# Patient Record
Sex: MALE | Race: WHITE | NOT HISPANIC OR LATINO
[De-identification: names, ages, dates, MRNs, and addresses within clinical notes are randomized per-mention and may not be internally consistent; named-entity substitution may affect disease eponyms.]

---

## 2018-04-30 PROBLEM — Z00.00 ENCOUNTER FOR PREVENTIVE HEALTH EXAMINATION: Status: ACTIVE | Noted: 2018-04-30

## 2018-05-14 ENCOUNTER — APPOINTMENT (OUTPATIENT)
Dept: OPHTHALMOLOGY | Facility: CLINIC | Age: 73
End: 2018-05-14
Payer: MEDICARE

## 2018-05-14 DIAGNOSIS — H26.9 UNSPECIFIED CATARACT: ICD-10-CM

## 2018-05-14 DIAGNOSIS — H43.393 OTHER VITREOUS OPACITIES, BILATERAL: ICD-10-CM

## 2018-05-14 DIAGNOSIS — Z98.890 OTHER SPECIFIED POSTPROCEDURAL STATES: ICD-10-CM

## 2018-05-14 PROCEDURE — 92014 COMPRE OPH EXAM EST PT 1/>: CPT

## 2018-05-14 PROCEDURE — 92004 COMPRE OPH EXAM NEW PT 1/>: CPT

## 2019-10-21 ENCOUNTER — APPOINTMENT (OUTPATIENT)
Dept: OPHTHALMOLOGY | Facility: CLINIC | Age: 74
End: 2019-10-21
Payer: MEDICARE

## 2019-10-21 ENCOUNTER — NON-APPOINTMENT (OUTPATIENT)
Age: 74
End: 2019-10-21

## 2019-10-21 PROCEDURE — 92014 COMPRE OPH EXAM EST PT 1/>: CPT

## 2024-04-01 ENCOUNTER — INPATIENT (INPATIENT)
Facility: HOSPITAL | Age: 79
LOS: 7 days | Discharge: HOME | DRG: 233 | End: 2024-04-09
Attending: THORACIC SURGERY (CARDIOTHORACIC VASCULAR SURGERY) | Admitting: STUDENT IN AN ORGANIZED HEALTH CARE EDUCATION/TRAINING PROGRAM
Payer: MEDICARE

## 2024-04-01 VITALS
TEMPERATURE: 99 F | HEIGHT: 63 IN | RESPIRATION RATE: 18 BRPM | DIASTOLIC BLOOD PRESSURE: 73 MMHG | WEIGHT: 149.91 LBS | HEART RATE: 99 BPM | OXYGEN SATURATION: 97 % | SYSTOLIC BLOOD PRESSURE: 116 MMHG

## 2024-04-01 DIAGNOSIS — I10 ESSENTIAL (PRIMARY) HYPERTENSION: ICD-10-CM

## 2024-04-01 DIAGNOSIS — S83.8X2A SPRAIN OF OTHER SPECIFIED PARTS OF LEFT KNEE, INITIAL ENCOUNTER: Chronic | ICD-10-CM

## 2024-04-01 DIAGNOSIS — E11.9 TYPE 2 DIABETES MELLITUS WITHOUT COMPLICATIONS: ICD-10-CM

## 2024-04-01 DIAGNOSIS — Z90.89 ACQUIRED ABSENCE OF OTHER ORGANS: Chronic | ICD-10-CM

## 2024-04-01 DIAGNOSIS — D64.9 ANEMIA, UNSPECIFIED: ICD-10-CM

## 2024-04-01 DIAGNOSIS — E78.00 PURE HYPERCHOLESTEROLEMIA, UNSPECIFIED: ICD-10-CM

## 2024-04-01 DIAGNOSIS — K08.409 PARTIAL LOSS OF TEETH, UNSPECIFIED CAUSE, UNSPECIFIED CLASS: Chronic | ICD-10-CM

## 2024-04-01 DIAGNOSIS — C61 MALIGNANT NEOPLASM OF PROSTATE: ICD-10-CM

## 2024-04-01 DIAGNOSIS — R07.9 CHEST PAIN, UNSPECIFIED: ICD-10-CM

## 2024-04-01 LAB
ADD ON TEST-SPECIMEN IN LAB: SIGNIFICANT CHANGE UP
ALBUMIN SERPL ELPH-MCNC: 4 G/DL — SIGNIFICANT CHANGE UP (ref 3.3–5)
ALP SERPL-CCNC: 96 U/L — SIGNIFICANT CHANGE UP (ref 40–120)
ALT FLD-CCNC: 13 U/L — SIGNIFICANT CHANGE UP (ref 10–45)
ANION GAP SERPL CALC-SCNC: 12 MMOL/L — SIGNIFICANT CHANGE UP (ref 5–17)
APTT BLD: 33.2 SEC — SIGNIFICANT CHANGE UP (ref 24.5–35.6)
AST SERPL-CCNC: 16 U/L — SIGNIFICANT CHANGE UP (ref 10–40)
BASOPHILS # BLD AUTO: 0.01 K/UL — SIGNIFICANT CHANGE UP (ref 0–0.2)
BASOPHILS NFR BLD AUTO: 0.2 % — SIGNIFICANT CHANGE UP (ref 0–2)
BILIRUB SERPL-MCNC: 0.3 MG/DL — SIGNIFICANT CHANGE UP (ref 0.2–1.2)
BUN SERPL-MCNC: 51 MG/DL — HIGH (ref 7–23)
CALCIUM SERPL-MCNC: 9.2 MG/DL — SIGNIFICANT CHANGE UP (ref 8.4–10.5)
CHLORIDE SERPL-SCNC: 104 MMOL/L — SIGNIFICANT CHANGE UP (ref 96–108)
CK MB CFR SERPL CALC: 1.9 NG/ML — SIGNIFICANT CHANGE UP (ref 0–6.7)
CK SERPL-CCNC: 34 U/L — SIGNIFICANT CHANGE UP (ref 30–200)
CO2 SERPL-SCNC: 25 MMOL/L — SIGNIFICANT CHANGE UP (ref 22–31)
CREAT SERPL-MCNC: 2.04 MG/DL — HIGH (ref 0.5–1.3)
EGFR: 33 ML/MIN/1.73M2 — LOW
EOSINOPHIL # BLD AUTO: 0.09 K/UL — SIGNIFICANT CHANGE UP (ref 0–0.5)
EOSINOPHIL NFR BLD AUTO: 2.1 % — SIGNIFICANT CHANGE UP (ref 0–6)
GLUCOSE BLDC GLUCOMTR-MCNC: 98 MG/DL — SIGNIFICANT CHANGE UP (ref 70–99)
GLUCOSE SERPL-MCNC: 140 MG/DL — HIGH (ref 70–99)
HCT VFR BLD CALC: 28.7 % — LOW (ref 39–50)
HGB BLD-MCNC: 9.5 G/DL — LOW (ref 13–17)
IMM GRANULOCYTES NFR BLD AUTO: 0.2 % — SIGNIFICANT CHANGE UP (ref 0–0.9)
INR BLD: 0.95 — SIGNIFICANT CHANGE UP (ref 0.85–1.18)
LYMPHOCYTES # BLD AUTO: 0.83 K/UL — LOW (ref 1–3.3)
LYMPHOCYTES # BLD AUTO: 18.9 % — SIGNIFICANT CHANGE UP (ref 13–44)
MAGNESIUM SERPL-MCNC: 1.8 MG/DL — SIGNIFICANT CHANGE UP (ref 1.6–2.6)
MCHC RBC-ENTMCNC: 31 PG — SIGNIFICANT CHANGE UP (ref 27–34)
MCHC RBC-ENTMCNC: 33.1 GM/DL — SIGNIFICANT CHANGE UP (ref 32–36)
MCV RBC AUTO: 93.8 FL — SIGNIFICANT CHANGE UP (ref 80–100)
MONOCYTES # BLD AUTO: 0.57 K/UL — SIGNIFICANT CHANGE UP (ref 0–0.9)
MONOCYTES NFR BLD AUTO: 13 % — SIGNIFICANT CHANGE UP (ref 2–14)
NEUTROPHILS # BLD AUTO: 2.88 K/UL — SIGNIFICANT CHANGE UP (ref 1.8–7.4)
NEUTROPHILS NFR BLD AUTO: 65.6 % — SIGNIFICANT CHANGE UP (ref 43–77)
NRBC # BLD: 0 /100 WBCS — SIGNIFICANT CHANGE UP (ref 0–0)
PLATELET # BLD AUTO: 145 K/UL — LOW (ref 150–400)
POTASSIUM SERPL-MCNC: 4.3 MMOL/L — SIGNIFICANT CHANGE UP (ref 3.5–5.3)
POTASSIUM SERPL-SCNC: 4.3 MMOL/L — SIGNIFICANT CHANGE UP (ref 3.5–5.3)
PROT SERPL-MCNC: 6.3 G/DL — SIGNIFICANT CHANGE UP (ref 6–8.3)
PROTHROM AB SERPL-ACNC: 10.9 SEC — SIGNIFICANT CHANGE UP (ref 9.5–13)
RBC # BLD: 3.06 M/UL — LOW (ref 4.2–5.8)
RBC # FLD: 14.6 % — HIGH (ref 10.3–14.5)
SODIUM SERPL-SCNC: 141 MMOL/L — SIGNIFICANT CHANGE UP (ref 135–145)
TROPONIN T, HIGH SENSITIVITY RESULT: 15 NG/L — SIGNIFICANT CHANGE UP (ref 0–51)
WBC # BLD: 4.39 K/UL — SIGNIFICANT CHANGE UP (ref 3.8–10.5)
WBC # FLD AUTO: 4.39 K/UL — SIGNIFICANT CHANGE UP (ref 3.8–10.5)

## 2024-04-01 PROCEDURE — 99223 1ST HOSP IP/OBS HIGH 75: CPT

## 2024-04-01 PROCEDURE — 99285 EMERGENCY DEPT VISIT HI MDM: CPT | Mod: FS

## 2024-04-01 PROCEDURE — 71045 X-RAY EXAM CHEST 1 VIEW: CPT | Mod: 26

## 2024-04-01 RX ORDER — TIZANIDINE 4 MG/1
2 TABLET ORAL
Refills: 0 | DISCHARGE

## 2024-04-01 RX ORDER — DEXTROSE 50 % IN WATER 50 %
25 SYRINGE (ML) INTRAVENOUS ONCE
Refills: 0 | Status: DISCONTINUED | OUTPATIENT
Start: 2024-04-01 | End: 2024-04-05

## 2024-04-01 RX ORDER — BUDESONIDE 32 UG/1
1 SPRAY, METERED NASAL
Refills: 0 | DISCHARGE

## 2024-04-01 RX ORDER — DEXTROSE 50 % IN WATER 50 %
12.5 SYRINGE (ML) INTRAVENOUS ONCE
Refills: 0 | Status: DISCONTINUED | OUTPATIENT
Start: 2024-04-01 | End: 2024-04-05

## 2024-04-01 RX ORDER — DICLOFENAC SODIUM 30 MG/G
2 GEL TOPICAL
Refills: 0 | DISCHARGE

## 2024-04-01 RX ORDER — ASPIRIN/CALCIUM CARB/MAGNESIUM 324 MG
324 TABLET ORAL ONCE
Refills: 0 | Status: COMPLETED | OUTPATIENT
Start: 2024-04-01 | End: 2024-04-01

## 2024-04-01 RX ORDER — BUPROPION HYDROCHLORIDE 150 MG/1
1 TABLET, EXTENDED RELEASE ORAL
Refills: 0 | DISCHARGE

## 2024-04-01 RX ORDER — POLYETHYLENE GLYCOL 3350 17 G/17G
17 POWDER, FOR SOLUTION ORAL
Refills: 0 | DISCHARGE

## 2024-04-01 RX ORDER — FEXOFENADINE HCL 30 MG
1 TABLET ORAL
Refills: 0 | DISCHARGE

## 2024-04-01 RX ORDER — AMLODIPINE BESYLATE 2.5 MG/1
10 TABLET ORAL DAILY
Refills: 0 | Status: DISCONTINUED | OUTPATIENT
Start: 2024-04-01 | End: 2024-04-02

## 2024-04-01 RX ORDER — ESCITALOPRAM OXALATE 10 MG/1
20 TABLET, FILM COATED ORAL DAILY
Refills: 0 | Status: DISCONTINUED | OUTPATIENT
Start: 2024-04-01 | End: 2024-04-05

## 2024-04-01 RX ORDER — INSULIN LISPRO 100/ML
VIAL (ML) SUBCUTANEOUS AT BEDTIME
Refills: 0 | Status: DISCONTINUED | OUTPATIENT
Start: 2024-04-01 | End: 2024-04-05

## 2024-04-01 RX ORDER — ATORVASTATIN CALCIUM 80 MG/1
40 TABLET, FILM COATED ORAL AT BEDTIME
Refills: 0 | Status: DISCONTINUED | OUTPATIENT
Start: 2024-04-01 | End: 2024-04-05

## 2024-04-01 RX ORDER — FLUTICASONE PROPIONATE 50 MCG
1 SPRAY, SUSPENSION NASAL
Refills: 0 | Status: DISCONTINUED | OUTPATIENT
Start: 2024-04-01 | End: 2024-04-05

## 2024-04-01 RX ORDER — SODIUM CHLORIDE 9 MG/ML
1000 INJECTION, SOLUTION INTRAVENOUS
Refills: 0 | Status: DISCONTINUED | OUTPATIENT
Start: 2024-04-01 | End: 2024-04-05

## 2024-04-01 RX ORDER — GLUCAGON INJECTION, SOLUTION 0.5 MG/.1ML
1 INJECTION, SOLUTION SUBCUTANEOUS ONCE
Refills: 0 | Status: DISCONTINUED | OUTPATIENT
Start: 2024-04-01 | End: 2024-04-05

## 2024-04-01 RX ORDER — CLOPIDOGREL BISULFATE 75 MG/1
300 TABLET, FILM COATED ORAL ONCE
Refills: 0 | Status: COMPLETED | OUTPATIENT
Start: 2024-04-01 | End: 2024-04-01

## 2024-04-01 RX ORDER — COLCHICINE 0.6 MG
1 TABLET ORAL
Refills: 0 | DISCHARGE

## 2024-04-01 RX ORDER — SODIUM CHLORIDE 9 MG/ML
1000 INJECTION INTRAMUSCULAR; INTRAVENOUS; SUBCUTANEOUS
Refills: 0 | Status: DISCONTINUED | OUTPATIENT
Start: 2024-04-02 | End: 2024-04-02

## 2024-04-01 RX ORDER — KETOCONAZOLE 20 MG/G
1 AEROSOL, FOAM TOPICAL
Refills: 0 | DISCHARGE

## 2024-04-01 RX ORDER — DEXTROSE 50 % IN WATER 50 %
15 SYRINGE (ML) INTRAVENOUS ONCE
Refills: 0 | Status: DISCONTINUED | OUTPATIENT
Start: 2024-04-01 | End: 2024-04-05

## 2024-04-01 RX ORDER — INSULIN LISPRO 100/ML
VIAL (ML) SUBCUTANEOUS
Refills: 0 | Status: DISCONTINUED | OUTPATIENT
Start: 2024-04-01 | End: 2024-04-05

## 2024-04-01 RX ADMIN — ESCITALOPRAM OXALATE 20 MILLIGRAM(S): 10 TABLET, FILM COATED ORAL at 23:29

## 2024-04-01 RX ADMIN — ATORVASTATIN CALCIUM 40 MILLIGRAM(S): 80 TABLET, FILM COATED ORAL at 22:58

## 2024-04-01 RX ADMIN — CLOPIDOGREL BISULFATE 300 MILLIGRAM(S): 75 TABLET, FILM COATED ORAL at 20:58

## 2024-04-01 RX ADMIN — Medication 324 MILLIGRAM(S): at 20:46

## 2024-04-01 RX ADMIN — AMLODIPINE BESYLATE 10 MILLIGRAM(S): 2.5 TABLET ORAL at 22:58

## 2024-04-01 NOTE — ED PROVIDER NOTE - PROGRESS NOTE DETAILS
hgb 9.5 / hct 28.7.   creatinine 2.0  no old labs for comparison but creatinine consistent w pts history of ckd.   ecg nonischemic. initial trop 15. cxr clear  vitals stable and cp free. given asa and plavix load.   admitted to [facility].

## 2024-04-01 NOTE — H&P ADULT - PROBLEM SELECTOR PLAN 1
Recurrent chest pain associated with SOB and abnormal exercise stress test  Pt loaded with Aspirin 325mg and Plavix 300mg  Possible cath in AM  Telemetry, IVF, NPO  Echo in AM

## 2024-04-01 NOTE — H&P ADULT - NSHPREVIEWOFSYSTEMS_GEN_ALL_CORE
GENERAL, CONSTITUTIONAL : denies recent weight loss, fever, chills  EYES, VISION: denies changes in vision   EARS, NOSE, THROAT: denies hearing loss  HEART, CARDIOVASCULAR:  + chest pain. SOB, . Denies arrhythmia, palpitations, LE edema, claudication  RESPIRATORY: +  SOB. Denies cough, wheezing, PND, orthopnea  GASTROINTESTINAL: +  bloody stool, dark tarry stool. Denies abdominal pain, heartburn,  GENITOURINARY: Denies frequent urination, urgency  MUSCULOSKELETAL: +  joint pain. Denies or swelling, restricted motion, musculoskeletal pain.   SKIN & INTEGUMENTARY: + Facial rash. Denies sores, blisters, blisters, growths.  NEUROLOGICAL: Denies numbness or tingling sensations, sensation loss, burning.   PSYCHIATRIC: Denies nervousness, anxiety, depression  ENDOCRINE Denies heat or cold intolerance, excessive thirst  HEMATOLOGIC/LYMPHATIC: Denies abnormal bleeding, bleeding of any kind

## 2024-04-01 NOTE — H&P ADULT - NSHPLABSRESULTS_GEN_ALL_CORE
9.5    4.39  )-----------( 145      ( 01 Apr 2024 19:10 )             28.7       04-01    141  |  104  |  51<H>  ----------------------------<  140<H>  4.3   |  25  |  2.04<H>    Ca    9.2      01 Apr 2024 19:10  Mg     1.8     04-01    TPro  6.3  /  Alb  4.0  /  TBili  0.3  /  DBili  x   /  AST  16  /  ALT  13  /  AlkPhos  96  04-01      PT/INR - ( 01 Apr 2024 19:10 )   PT: 10.9 sec;   INR: 0.95          PTT - ( 01 Apr 2024 19:10 )  PTT:33.2 sec    CARDIAC MARKERS ( 01 Apr 2024 19:10 )  x     / x     / 34 U/L / x     / 1.9 ng/mL        Urinalysis Basic - ( 01 Apr 2024 19:10 )    Color: x / Appearance: x / SG: x / pH: x  Gluc: 140 mg/dL / Ketone: x  / Bili: x / Urobili: x   Blood: x / Protein: x / Nitrite: x   Leuk Esterase: x / RBC: x / WBC x   Sq Epi: x / Non Sq Epi: x / Bacteria: x

## 2024-04-01 NOTE — ED PROVIDER NOTE - CLINICAL SUMMARY MEDICAL DECISION MAKING FREE TEXT BOX
history of bph, prostate ca (s/p radiation, no current treatment), CKD, here w two episodes of chest pain after failed stress test at cardiologist tonight. one episode of chest pain at rest. +sob and nausea. currently cp free. episodes of cp intermittent x 4-5 weeks.   discussed w cardiologist dr dorian arora .  pt well appearing, stable vitals, exam unremarkable  cp while at rest after failed stress test today. discussed w cardiologist.   admit for unstable angina.   ecg on arrival nonischemic  will load w aspirin. plan for admission. pt aware and agreeable. cp free. stable vitals.

## 2024-04-01 NOTE — PATIENT PROFILE ADULT - FALL HARM RISK - HARM RISK INTERVENTIONS

## 2024-04-01 NOTE — H&P ADULT - NSICDXPASTMEDICALHX_GEN_ALL_CORE_FT
PAST MEDICAL HISTORY:  Diabetes     Genital herpes     Gout     H/O arthritis     Hypercholesterolemia     Hypertension     IBS (irritable bowel syndrome)     Obstructive sleep apnea     Prostate cancer     Shingles

## 2024-04-01 NOTE — H&P ADULT - HISTORY OF PRESENT ILLNESS
This is a 79 y/o male with HTN, DM-II, Hypercholesterolemia, prostate Ca (s/p radiation, no current treatment), CKD, presents to the [facility] ER with chest pain that started over last 4-5 weeks. Pain initially started while walking in [place], relieved with rest. Since then, he has been experiencing pain at rest and minimal exertion, increased in frequency and associated with SOB. Pain is rated 7-8/10, lasting a few minutes. He had an episode of nausea and vomiting day before admission.   He had a stress test on day of admission that was abnormal -- as per the cardiologist Dr Morton, pt achieved 4.5 mins on treadmill, reached target HR of 129bpm. He had chest discomfort at 3rd minute, EKG showed PVCs, no ST-T changes. Chest pain resolved once he stopped exercise. Shortly after, EKG showed slurred ST segments in inferolateral leads that took 7 mins to resolve. He was sent home with recommendation to return for a planned nuclear stress test the next day. However, he had multiple episodes of chest pain, SOB after leaving the office, therefore, he came to the ER for evaluation      In the ER, initial /73, HR 99, R 18, T 98.7, O2 sat 97% RA. Labs with H/H 9.5/28.7, Ptls 145, BUN/Cr 51/2.04, trop 15. He received Aspirin 325mg and Plavix 300mg load and admitted to telemetry for further evaluation

## 2024-04-01 NOTE — H&P ADULT - PROBLEM SELECTOR PLAN 2
Pt with IBS and occasional tarry stool  Pt with planned EGD/Colonoscopy end of April at [facility]  H/H 9.5/28.7 on admission  May need GI clearance prior to cath

## 2024-04-01 NOTE — H&P ADULT - PROBLEM SELECTOR PLAN 7
Completed treatment at [facility], now in remission  Continue to monitor

## 2024-04-01 NOTE — ED PROVIDER NOTE - OBJECTIVE STATEMENT
78 yr old male, history of bph, prostate ca (s/p radiation, no current treatment), CKD, presents to the Emergency Department w chest pain. over last 4-5 weeks pt having episodes of chest discomfort and SOB. increasing in frequency. two episodes yesterday. had stress test today that was abnormal, was planned for a nuclear stress test tomorrow. had two further episodes of chest discomfort once leaving office. one when walking to PT appointment that resolved w rest. second episode was at rest at home. +sob and nausea.     discussed w cardiologist dr dorian arora .  stress test toda - 4.5 mins on treadmill, reached target HR of 129bpm. chest discomfort at 3rd minute. ekg showed pvcs no st/t changes. once stopped exercise cp resolved. shortly after ecg showed slurred ST segments in inferolateral leads that took 7 mins to resolve. 78 yr old male, history of bph, prostate ca (s/p radiation, no current treatment), CKD, presents to the Emergency Department w chest pain. over last 4-5 weeks pt having episodes of chest discomfort and SOB. increasing in frequency. two episodes yesterday. had stress test today that was abnormal, was planned for a nuclear stress test tomorrow. had two further episodes of chest discomfort once leaving office. one when walking to PT appointment that resolved w rest. second episode was at rest at home. +sob and nausea. cardiologist told her to come to ED for admission. currently chest pain free.   no palpitations, vomiting, leg swelling, near-syncope / syncope. no fever, abd pain. no headache, vision changes, extremity weakness / numbness / tingling.   no recent infectious symptoms. no prolonged immobilization / recent travel, hx dvt/pe.    discussed w cardiologist dr dorian arora .  stress test toda - 4.5 mins on treadmill, reached target HR of 129bpm. chest discomfort at 3rd minute. ekg showed pvcs no st/t changes. once stopped exercise cp resolved. shortly after ecg showed slurred ST segments in inferolateral leads that took 7 mins to resolve.

## 2024-04-01 NOTE — H&P ADULT - NSICDXPASTSURGICALHX_GEN_ALL_CORE_FT
PAST SURGICAL HISTORY:  History of tonsillectomy     Injury of meniscus of left knee     S/P tooth extraction

## 2024-04-01 NOTE — H&P ADULT - NSHPPHYSICALEXAM_GEN_ALL_CORE
T(C): 36.6 (04-01-24 @ 22:09), Max: 37.1 (04-01-24 @ 17:14)  HR: 73 (04-01-24 @ 22:09) (69 - 99)  BP: 120/72 (04-01-24 @ 22:09) (116/73 - 124/77)  RR: 16 (04-01-24 @ 22:09) (16 - 18)  SpO2: 97% (04-01-24 @ 22:09) (97% - 98%)  Wt(kg): --    Appearance: Normal	  HEENT:   Normal oral mucosa, PERRL, EOMI	  Neck: Supple,  - JVD; No Carotid Bruit and 2+ pulses B/L  Cardiovascular: Normal S1 S2, No JVD, + 3/6 TANYA  Respiratory: Lungs clear to auscultation, no Rales, Rhonchi, Wheezing	  Gastrointestinal:  Soft, Non-tender, + BS	  Skin: No rashes, No ecchymoses, No cyanosis  Extremities: Normal range of motion, No clubbing, cyanosis or edema  Vascular: Femoral pulses 2+ b/l without bruit, DP 1+ b/l, PT 1+ b/l  Neurologic: Non-focal  Psychiatry: A & O x 3, Mood & affect appropriate

## 2024-04-01 NOTE — ED ADULT NURSE NOTE - OBJECTIVE STATEMENT
77 y/o M with pmhx CKD3, BPH, presents to the ED c/o CP since this afternoon. Per pt, "I was at a stress test earlier today because I had CP. They told me they saw something abnormal and that I needed to come back for a nuclear stress test tomorrow. I still had pain so I just came here." CP is left sided, intermittent, and does not radiate. Nothing makes pain better. Walking makes pain worse and induces SOB. Denies palpitations, N/V, diaphoresis, and any other complaints at this time. On exam, A&Ox3, NAD, resting comfortably in bed. Respirations even and unlabored, speaking in complete sentences, no increased WOB. Skin color appropriate for ethnicity. Pt placed on the cardiac monitor. PIV placed. Labs sent.

## 2024-04-01 NOTE — ED ADULT TRIAGE NOTE - CHIEF COMPLAINT QUOTE
Pt aaox3 c/o CP s/p stress test this afternoon ~2.5 hrs ago. Pt had stress test done because of CP with exertion, but this afternoon the CP persisted even at rest. Pain is midsternal and radiates to the left. Pt endorses mild SOB at this time.

## 2024-04-01 NOTE — H&P ADULT - PROBLEM SELECTOR PLAN 3
BUN 51, Cr 2.04 on admission  Unknown baseline  Start IV hydration overnight for possible cath in AM

## 2024-04-01 NOTE — ED ADULT NURSE REASSESSMENT NOTE - NS ED NURSE REASSESS COMMENT FT1
Received pt for continuous care. Pending results, dispo, plan.   Pt is alert and oriented, A&O4, steady gait noted.

## 2024-04-01 NOTE — H&P ADULT - NS ATTEND AMEND GEN_ALL_CORE FT
77 yo man PMHx of DM2, HTN, prostate cancer on remission, and CKD (last Cr 1.7) who was admitted for unstable angina and recent abnormal exercise stress test (ST depression lasting to recovery, chest pain, and PVCs).    Assessment  1. Unstable angina   Recent abnormal exercise stress test (STD to recovery, chest pain, and PVCs)  2. HTN  3. DM2  4. CKD  5. Anemia  Patient denies any recent clinical bleeding    Plan  1. Plan for [facility] today  2. ASA 81mg PO QD and high intensity statin  3. TTE  4. Pre and post cath IVF for CKD  5. Anemia workup with iron studies  6. Amlodipine 10mg PO QD    During non face-to-face time, I reviewed relevant portions of the patient’s medical record. During face-to-face time, I took a relevant history and examined the patient. I also explained differential diagnoses, relevant cardiac diagnoses, workup, and management plan, which required a high level of medical decision making. I answered all questions related to the patient's medical conditions.     Yamilet Gould M.D.  Attending Cardiologist  [facility]

## 2024-04-02 ENCOUNTER — TRANSCRIPTION ENCOUNTER (OUTPATIENT)
Age: 79
End: 2024-04-02

## 2024-04-02 ENCOUNTER — RESULT REVIEW (OUTPATIENT)
Age: 79
End: 2024-04-02

## 2024-04-02 DIAGNOSIS — N18.9 CHRONIC KIDNEY DISEASE, UNSPECIFIED: ICD-10-CM

## 2024-04-02 LAB
A1C WITH ESTIMATED AVERAGE GLUCOSE RESULT: 5.3 % — SIGNIFICANT CHANGE UP (ref 4–5.6)
ANION GAP SERPL CALC-SCNC: 10 MMOL/L — SIGNIFICANT CHANGE UP (ref 5–17)
BUN SERPL-MCNC: 44 MG/DL — HIGH (ref 7–23)
CALCIUM SERPL-MCNC: 9.2 MG/DL — SIGNIFICANT CHANGE UP (ref 8.4–10.5)
CHLORIDE SERPL-SCNC: 108 MMOL/L — SIGNIFICANT CHANGE UP (ref 96–108)
CHOLEST SERPL-MCNC: 98 MG/DL — SIGNIFICANT CHANGE UP
CK MB CFR SERPL CALC: 1.9 NG/ML — SIGNIFICANT CHANGE UP (ref 0–6.7)
CK SERPL-CCNC: 36 U/L — SIGNIFICANT CHANGE UP (ref 30–200)
CO2 SERPL-SCNC: 25 MMOL/L — SIGNIFICANT CHANGE UP (ref 22–31)
CREAT SERPL-MCNC: 1.74 MG/DL — HIGH (ref 0.5–1.3)
EGFR: 40 ML/MIN/1.73M2 — LOW
ESTIMATED AVERAGE GLUCOSE: 105 MG/DL — SIGNIFICANT CHANGE UP (ref 68–114)
GLUCOSE BLDC GLUCOMTR-MCNC: 104 MG/DL — HIGH (ref 70–99)
GLUCOSE BLDC GLUCOMTR-MCNC: 120 MG/DL — HIGH (ref 70–99)
GLUCOSE BLDC GLUCOMTR-MCNC: 138 MG/DL — HIGH (ref 70–99)
GLUCOSE BLDC GLUCOMTR-MCNC: 96 MG/DL — SIGNIFICANT CHANGE UP (ref 70–99)
GLUCOSE SERPL-MCNC: 91 MG/DL — SIGNIFICANT CHANGE UP (ref 70–99)
HCT VFR BLD CALC: 30.6 % — LOW (ref 39–50)
HCV AB S/CO SERPL IA: 0.04 S/CO — SIGNIFICANT CHANGE UP
HCV AB SERPL-IMP: SIGNIFICANT CHANGE UP
HDLC SERPL-MCNC: 48 MG/DL — SIGNIFICANT CHANGE UP
HGB BLD-MCNC: 9.9 G/DL — LOW (ref 13–17)
LIPID PNL WITH DIRECT LDL SERPL: 38 MG/DL — SIGNIFICANT CHANGE UP
MAGNESIUM SERPL-MCNC: 2 MG/DL — SIGNIFICANT CHANGE UP (ref 1.6–2.6)
MCHC RBC-ENTMCNC: 31 PG — SIGNIFICANT CHANGE UP (ref 27–34)
MCHC RBC-ENTMCNC: 32.4 GM/DL — SIGNIFICANT CHANGE UP (ref 32–36)
MCV RBC AUTO: 95.9 FL — SIGNIFICANT CHANGE UP (ref 80–100)
NON HDL CHOLESTEROL: 50 MG/DL — SIGNIFICANT CHANGE UP
NRBC # BLD: 0 /100 WBCS — SIGNIFICANT CHANGE UP (ref 0–0)
OB PNL STL: NEGATIVE — SIGNIFICANT CHANGE UP
PLATELET # BLD AUTO: 142 K/UL — LOW (ref 150–400)
POTASSIUM SERPL-MCNC: 4.2 MMOL/L — SIGNIFICANT CHANGE UP (ref 3.5–5.3)
POTASSIUM SERPL-SCNC: 4.2 MMOL/L — SIGNIFICANT CHANGE UP (ref 3.5–5.3)
RBC # BLD: 3.19 M/UL — LOW (ref 4.2–5.8)
RBC # FLD: 14.3 % — SIGNIFICANT CHANGE UP (ref 10.3–14.5)
SODIUM SERPL-SCNC: 143 MMOL/L — SIGNIFICANT CHANGE UP (ref 135–145)
TRIGL SERPL-MCNC: 48 MG/DL — SIGNIFICANT CHANGE UP
TROPONIN T, HIGH SENSITIVITY RESULT: 18 NG/L — SIGNIFICANT CHANGE UP (ref 0–51)
WBC # BLD: 4.06 K/UL — SIGNIFICANT CHANGE UP (ref 3.8–10.5)
WBC # FLD AUTO: 4.06 K/UL — SIGNIFICANT CHANGE UP (ref 3.8–10.5)

## 2024-04-02 PROCEDURE — 93880 EXTRACRANIAL BILAT STUDY: CPT | Mod: 26

## 2024-04-02 PROCEDURE — 93458 L HRT ARTERY/VENTRICLE ANGIO: CPT | Mod: 26

## 2024-04-02 PROCEDURE — 93306 TTE W/DOPPLER COMPLETE: CPT | Mod: 26

## 2024-04-02 PROCEDURE — 99152 MOD SED SAME PHYS/QHP 5/>YRS: CPT

## 2024-04-02 PROCEDURE — 71046 X-RAY EXAM CHEST 2 VIEWS: CPT | Mod: 26

## 2024-04-02 RX ORDER — METOPROLOL TARTRATE 50 MG
12.5 TABLET ORAL EVERY 8 HOURS
Refills: 0 | Status: DISCONTINUED | OUTPATIENT
Start: 2024-04-02 | End: 2024-04-05

## 2024-04-02 RX ORDER — ASPIRIN/CALCIUM CARB/MAGNESIUM 324 MG
81 TABLET ORAL DAILY
Refills: 0 | Status: DISCONTINUED | OUTPATIENT
Start: 2024-04-02 | End: 2024-04-05

## 2024-04-02 RX ORDER — CLOPIDOGREL BISULFATE 75 MG/1
75 TABLET, FILM COATED ORAL DAILY
Refills: 0 | Status: DISCONTINUED | OUTPATIENT
Start: 2024-04-02 | End: 2024-04-02

## 2024-04-02 RX ORDER — PANTOPRAZOLE SODIUM 20 MG/1
40 TABLET, DELAYED RELEASE ORAL
Refills: 0 | Status: DISCONTINUED | OUTPATIENT
Start: 2024-04-02 | End: 2024-04-05

## 2024-04-02 RX ORDER — BUPROPION HYDROCHLORIDE 150 MG/1
150 TABLET, EXTENDED RELEASE ORAL DAILY
Refills: 0 | Status: DISCONTINUED | OUTPATIENT
Start: 2024-04-02 | End: 2024-04-05

## 2024-04-02 RX ORDER — SODIUM CHLORIDE 9 MG/ML
1000 INJECTION INTRAMUSCULAR; INTRAVENOUS; SUBCUTANEOUS
Refills: 0 | Status: DISCONTINUED | OUTPATIENT
Start: 2024-04-02 | End: 2024-04-08

## 2024-04-02 RX ORDER — POLYETHYLENE GLYCOL 3350 17 G/17G
17 POWDER, FOR SOLUTION ORAL DAILY
Refills: 0 | Status: DISCONTINUED | OUTPATIENT
Start: 2024-04-02 | End: 2024-04-05

## 2024-04-02 RX ORDER — ALLOPURINOL 300 MG
300 TABLET ORAL DAILY
Refills: 0 | Status: DISCONTINUED | OUTPATIENT
Start: 2024-04-02 | End: 2024-04-05

## 2024-04-02 RX ORDER — HEPARIN SODIUM 5000 [USP'U]/ML
5000 INJECTION INTRAVENOUS; SUBCUTANEOUS EVERY 8 HOURS
Refills: 0 | Status: DISCONTINUED | OUTPATIENT
Start: 2024-04-03 | End: 2024-04-05

## 2024-04-02 RX ORDER — ACETAMINOPHEN 500 MG
650 TABLET ORAL EVERY 6 HOURS
Refills: 0 | Status: DISCONTINUED | OUTPATIENT
Start: 2024-04-02 | End: 2024-04-05

## 2024-04-02 RX ADMIN — Medication 12.5 MILLIGRAM(S): at 18:01

## 2024-04-02 RX ADMIN — ESCITALOPRAM OXALATE 20 MILLIGRAM(S): 10 TABLET, FILM COATED ORAL at 21:35

## 2024-04-02 RX ADMIN — ATORVASTATIN CALCIUM 40 MILLIGRAM(S): 80 TABLET, FILM COATED ORAL at 21:35

## 2024-04-02 RX ADMIN — Medication 1 SPRAY(S): at 07:54

## 2024-04-02 RX ADMIN — AMLODIPINE BESYLATE 10 MILLIGRAM(S): 2.5 TABLET ORAL at 06:12

## 2024-04-02 RX ADMIN — SODIUM CHLORIDE 230 MILLILITER(S): 9 INJECTION INTRAMUSCULAR; INTRAVENOUS; SUBCUTANEOUS at 14:43

## 2024-04-02 RX ADMIN — Medication 1 SPRAY(S): at 18:01

## 2024-04-02 RX ADMIN — SODIUM CHLORIDE 75 MILLILITER(S): 9 INJECTION INTRAMUSCULAR; INTRAVENOUS; SUBCUTANEOUS at 00:19

## 2024-04-02 RX ADMIN — Medication 81 MILLIGRAM(S): at 12:05

## 2024-04-02 RX ADMIN — Medication 300 MILLIGRAM(S): at 21:35

## 2024-04-02 RX ADMIN — CLOPIDOGREL BISULFATE 75 MILLIGRAM(S): 75 TABLET, FILM COATED ORAL at 12:05

## 2024-04-02 RX ADMIN — PANTOPRAZOLE SODIUM 40 MILLIGRAM(S): 20 TABLET, DELAYED RELEASE ORAL at 12:05

## 2024-04-02 NOTE — PROGRESS NOTE ADULT - PROBLEM SELECTOR PLAN 6
Pt unsure of Rosuvastatin dose, will call pharmacy in AM  Check Lipid profile Pt unsure of Rosuvastatin dose, will call pharmacy in AM  -lipid profile unremarkable

## 2024-04-02 NOTE — PROGRESS NOTE ADULT - ASSESSMENT
78 male, HTN, DM-II, HPL, prostate cancer, presents to the ER with recurrent chest pain and SOB after abnormal outpatient exercise stress test  04/01/24 with residual symptoms at home after procedure. Patient loaded with ASA and Plavix in ED, planned for cath 04/02, Course c/b Cr 2.01 Bun 51 now s/p pre-cath fluids. C/b anemia, history of tarry stools with outpatient Colonoscopy/EGD planned for April with [facility].  78 male, HTN, DM-II, HPL, prostate cancer, presents to the ER with recurrent chest pain and SOB after abnormal outpatient exercise stress test  04/01/24 with residual symptoms at home after procedure. Patient loaded with ASA and Plavix in ED, planned for cath 04/02, Course c/b Cr 2.01 Bun 51 now s/p pre-cath fluids. C/b anemia, history of tarry stools with outpatient Colonoscopy/EGD planned for April with [facility].     EKG: 			  ASA II	Mallampati class: I   Anginal Class: III    PULSES:   -Radial: 2+ b/l   -Femoral: no bruits b/l   -DP: 2+ b/l   -PT: 2+ b/l     -Levaquin (Rash)  clarithromycin (Other)  penicillins (Unknown)    -H/H = 9.9/30.6  9.5/28.7  -Patient reports he has been experiePt denies BRBPR, hematuria, hematochezia, melena. Patient initially loaded with Plavix 300mg PO and ASA 325mg PO in ER yesterday. Today, Pt loaded w/ ASA 81 mg x1 and Plavix 75 mg x1  -BUN/Cr = 44/1.74  -EF unknown pending TTE. Euvolemic on exam. has been relieving IVF overnight at 75 cc/hr hrs pre procedure    Sedation Plan:   Moderate  Patient Is Suitable Candidate For Sedation?     Yes    Risks & benefits of procedure and alternative therapy have been explained to the patient by the Interventional Cardiology Fellow including but not limited to: allergic reaction, bleeding with possible need for blood transfusion, infection, renal and vascular compromise, limb damage, arrhythmia, stroke, vessel dissection/perforation, myocardial infarction, and emergent CABG. Informed consent obtained at bedside and included in chart. 78 male, HTN, DM-II, HPL, prostate cancer, presents to the ER with recurrent chest pain and SOB after abnormal outpatient exercise stress test  04/01/24 with residual symptoms at home after procedure. Patient loaded with ASA and Plavix in ED, planned for cath 04/02, Course c/b Cr 2.01 Bun 51 now s/p pre-cath fluids. C/b anemia, history of tarry stools with outpatient Colonoscopy/EGD planned for April with [facility].     			  ASA II	Mallampati class: I   Anginal Class: III    PULSES:   -Radial: 2+ b/l   -Femoral: no bruits b/l   -DP: 2+ b/l   -PT: 2+ b/l     -Levaquin (Rash)  clarithromycin (Other)  penicillins (Unknown)    -H/H = 9.9/30.6  9.5/28.7  -Patient reports he has been experiencing dark tarry stools on/off and has an EGD/colonoscopy planned for end of April. These findings discussed with Dr. Portillo and Dr. owusu who agree to move forward with cath. Patient initially loaded with Plavix 300mg PO and ASA 325mg PO in ER yesterday. Today, Pt loaded w/ ASA 81 mg x1 and Plavix 75 mg x1  -BUN/Cr = 44/1.74  -EF unknown pending TTE. Euvolemic on exam. has been on IVF overnight at 75 cc/hr hrs pre procedure    Sedation Plan:   Moderate  Patient Is Suitable Candidate For Sedation?     Yes    Risks & benefits of procedure and alternative therapy have been explained to the patient by the Interventional Cardiology Fellow including but not limited to: allergic reaction, bleeding with possible need for blood transfusion, infection, renal and vascular compromise, limb damage, arrhythmia, stroke, vessel dissection/perforation, myocardial infarction, and emergent CABG. Informed consent obtained at bedside and included in chart. 78 male, HTN, DM-II, HPL, prostate cancer, presents to the ER with recurrent chest pain and SOB after abnormal outpatient exercise stress test  04/01/24 with residual symptoms at home after procedure. Patient loaded with ASA and Plavix in ED, planned for cath 04/02, Course c/b Cr 2.01 Bun 51 now s/p pre-cath fluids. C/b anemia, history of tarry stools with outpatient Colonoscopy/EGD planned for April with [id number]. Planned for C with Dr. Portillo today to r/o ACS i/s/o unstable angina and abnormal exercise stress test     			  ASA II	Mallampati class: I   Anginal Class: III    PULSES:   -Radial: 2+ b/l   -Femoral: no bruits b/l   -DP: 2+ b/l   -PT: 2+ b/l     -Levaquin (Rash)  clarithromycin (Other)  penicillins (Unknown)    -H/H = 9.9/30.6  9.5/28.7  -Patient reports he has been experiencing dark tarry stools on/off and has an EGD/colonoscopy planned for end of April. These findings discussed with Dr. Portillo and Dr. owusu who agree to move forward with cath. Patient initially loaded with Plavix 300mg PO and ASA 325mg PO in ER yesterday. Today, Pt loaded w/ ASA 81 mg x1 and Plavix 75 mg x1  -BUN/Cr = 44/1.74  -EF unknown pending TTE. Euvolemic on exam. has been on IVF overnight at 75 cc/hr hrs pre procedure    Sedation Plan:   Moderate  Patient Is Suitable Candidate For Sedation?     Yes    Risks & benefits of procedure and alternative therapy have been explained to the patient by the Interventional Cardiology Fellow including but not limited to: allergic reaction, bleeding with possible need for blood transfusion, infection, renal and vascular compromise, limb damage, arrhythmia, stroke, vessel dissection/perforation, myocardial infarction, and emergent CABG. Informed consent obtained at bedside and included in chart. 78 male, HTN, DM-II, HPL, prostate cancer, presents to the ER with recurrent chest pain and SOB after abnormal outpatient exercise stress test  04/01/24 with residual symptoms at home after procedure. Patient loaded with ASA and Plavix in ED, planned for cath 04/02, Course c/b Cr 2.01 Bun 51 now s/p pre-cath fluids. C/b anemia, history of tarry stools with outpatient Colonoscopy/EGD planned for April with [facility]. Planned for C with Dr. Portillo today to r/o ACS i/s/o unstable angina and abnormal exercise stress test.     			  ASA II	Mallampati class: I   Anginal Class: III    PULSES:   -Radial: 2+ b/l   -Femoral: no bruits b/l   -DP: 2+ b/l   -PT: 2+ b/l     -Levaquin (Rash)  clarithromycin (Other)  penicillins (Unknown)    -H/H = 9.9/30.6  9.5/28.7  -Patient reports he has been experiencing dark tarry stools on/off and has an EGD/colonoscopy planned for end of April. These findings discussed with Dr. Portillo and Dr. owusu who agree to move forward with cath. Patient initially loaded with Plavix 300mg PO and ASA 325mg PO in ER yesterday. Today, Pt loaded w/ ASA 81 mg x1 and Plavix 75 mg x1  -BUN/Cr = 44/1.74  -EF unknown pending TTE. Euvolemic on exam. has been on IVF overnight at 75 cc/hr hrs pre procedure    Sedation Plan:   Moderate  Patient Is Suitable Candidate For Sedation?     Yes    Risks & benefits of procedure and alternative therapy have been explained to the patient by the Interventional Cardiology Fellow including but not limited to: allergic reaction, bleeding with possible need for blood transfusion, infection, renal and vascular compromise, limb damage, arrhythmia, stroke, vessel dissection/perforation, myocardial infarction, and emergent CABG. Informed consent obtained at bedside and included in chart.

## 2024-04-02 NOTE — PROGRESS NOTE ADULT - PROBLEM SELECTOR PLAN 1
Recurrent chest pain associated with SOB and abnormal exercise stress test  Pt loaded with Aspirin 325mg and Plavix 300mg in ED  Scheduled for Cath 04/02  Pending TTE 04/02 Recurrent chest pain associated with SOB and abnormal exercise stress test  Pt loaded with Aspirin 325mg and Plavix 300mg in ED  Scheduled for Cath 04/02  Pending TTE 04/02  Systolic murmur, patient reports MVP Recurrent chest pain associated with SOB and abnormal exercise stress test, neg trops in ED and non ischemic EKG in ER  Pt loaded with Aspirin 325mg and Plavix 300mg in ED 4/1/24   Scheduled for Cath today with Dr. Portillo: consent in chart, DAPT ordered and IVF running   Pending TTE 04/02  Systolic murmur, patient reports MVP

## 2024-04-02 NOTE — PROGRESS NOTE ADULT - PROBLEM SELECTOR PLAN 2
Pt with IBS-C and occasional tarry stool  Pt with planned EGD/Colonoscopy end of April at [facility]  H/H 9.5/28.7 on admission  May need GI clearance prior to cath Pt with IBS-C and occasional tarry stool  Pt with [place] [facility]  -fecal occult test negative this admission   H/H 9.5/28.7 on admission-- per the son at bedside, patient is at baseline H/H   -f/u iron studies in AM

## 2024-04-02 NOTE — PROGRESS NOTE ADULT - SUBJECTIVE AND OBJECTIVE BOX
Cardiology PA Adult Progress Note    Subjective Assessment:    ROS negative except as noted above.  	  MEDICATIONS:  amLODIPine   Tablet 10 milliGRAM(s) Oral daily  escitalopram 20 milliGRAM(s) Oral daily  atorvastatin 40 milliGRAM(s) Oral at bedtime  dextrose 50% Injectable 12.5 Gram(s) IV Push once  dextrose 50% Injectable 25 Gram(s) IV Push once  dextrose 50% Injectable 25 Gram(s) IV Push once  dextrose Oral Gel 15 Gram(s) Oral once PRN  glucagon  Injectable 1 milliGRAM(s) IntraMuscular once  insulin lispro (ADMELOG) corrective regimen sliding scale   SubCutaneous at bedtime  insulin lispro (ADMELOG) corrective regimen sliding scale   SubCutaneous three times a day before meals  dextrose 5%. 1000 milliLiter(s) IV Continuous <Continuous>  dextrose 5%. 1000 milliLiter(s) IV Continuous <Continuous>  fluticasone propionate 50 MICROgram(s)/spray Nasal Spray 1 Spray(s) Both Nostrils two times a day  sodium chloride 0.9%. 1000 milliLiter(s) IV Continuous <Continuous>      	    [PHYSICAL EXAM:  TELEMETRY:  T(C): 36.4 (04-02-24 @ 06:09), Max: 37.1 (04-01-24 @ 17:14)  HR: 73 (04-02-24 @ 06:09) (69 - 99)  BP: 118/70 (04-02-24 @ 06:09) (107/59 - 124/77)  RR: 20 (04-02-24 @ 06:09) (16 - 20)  SpO2: 98% (04-02-24 @ 06:09) (97% - 98%)  Wt(kg): --  I&O's Summary    Height (cm): 160 (04-01 @ 22:09)  Weight (kg): 68.6 (04-01 @ 22:09)  BMI (kg/m2): 26.8 (04-01 @ 22:09)  BSA (m2): 1.72 (04-01 @ 22:09)                                        Appearance: Normal	  HEENT:   Normal oral mucosa, PERRLA, EOMI	  Neck: Supple, + JVD/ - JVD; Carotid Bruit   Cardiovascular: Normal S1 S2, No JVD, No murmurs,   Respiratory: Lungs clear to auscultation/Decreased Breath Sounds/No Rales, Rhonchi, Wheezing	  Gastrointestinal:  Soft, Non-tender, + BS	  Skin: No rashes, No ecchymoses, No cyanosis  Extremities: Normal range of motion, No clubbing, cyanosis or edema  Vascular: Peripheral pulses palpable 2+ bilaterally  Neurologic: Non-focal  Psychiatry: A & O x 3, Mood & affect appropriate      	    ECG:  	  RADIOLOGY:   DIAGNOSTIC TESTING:  [ ] Echocardiogram:   [ ]  Catheterization:  [ ] Stress Test:    [ ] YESSY  OTHER: 	    LABS:	 	  CARDIAC MARKERS:                                  9.5    4.39  )-----------( 145      ( 01 Apr 2024 19:10 )             28.7     04-01    141  |  104  |  51<H>  ----------------------------<  140<H>  4.3   |  25  |  2.04<H>    Ca    9.2      01 Apr 2024 19:10  Mg     1.8     04-01    TPro  6.3  /  Alb  4.0  /  TBili  0.3  /  DBili  x   /  AST  16  /  ALT  13  /  AlkPhos  96  04-01    proBNP:   Lipid Profile:   HgA1c:   TSH:   PT/INR - ( 01 Apr 2024 19:10 )   PT: 10.9 sec;   INR: 0.95          PTT - ( 01 Apr 2024 19:10 )  PTT:33.2 sec Cardiology PA Adult Progress Note    Subjective Assessment: Patient seen and examined at bedside. Endorses a slight sensation of shortness of breath, feeling like he can't take a full breath. States this shortness of breath has been ongoing and is better than before. Denies active chest pain, palpitations, headache, nausea.   ROS negative except as noted above.   	  MEDICATIONS:  amLODIPine   Tablet 10 milliGRAM(s) Oral daily  escitalopram 20 milliGRAM(s) Oral daily  atorvastatin 40 milliGRAM(s) Oral at bedtime  dextrose 50% Injectable 12.5 Gram(s) IV Push once  dextrose 50% Injectable 25 Gram(s) IV Push once  dextrose 50% Injectable 25 Gram(s) IV Push once  dextrose Oral Gel 15 Gram(s) Oral once PRN  glucagon  Injectable 1 milliGRAM(s) IntraMuscular once  insulin lispro (ADMELOG) corrective regimen sliding scale SubCutaneous at bedtime  insulin lispro (ADMELOG) corrective regimen sliding scale SubCutaneous three times a day before meals  dextrose 5%. 1000 milliLiter(s) IV Continuous <Continuous>  dextrose 5%. 1000 milliLiter(s) IV Continuous <Continuous>  fluticasone propionate 50 MICROgram(s)/spray Nasal Spray 1 Spray(s) Both Nostrils two times a day  sodium chloride 0.9%. 1000 milliLiter(s) IV Continuous <Continuous>      	    [PHYSICAL EXAM:  TELEMETRY:  T(C): 36.4 (04-02-24 @ 06:09), Max: 37.1 (04-01-24 @ 17:14)  HR: 73 (04-02-24 @ 06:09) (69 - 99)  BP: 118/70 (04-02-24 @ 06:09) (107/59 - 124/77)  RR: 20 (04-02-24 @ 06:09) (16 - 20)  SpO2: 98% (04-02-24 @ 06:09) (97% - 98%)  Wt(kg): --  I&O's Summary    Height (cm): 160 (04-01 @ 22:09)  Weight (kg): 68.6 (04-01 @ 22:09)  BMI (kg/m2): 26.8 (04-01 @ 22:09)  BSA (m2): 1.72 (04-01 @ 22:09)                                        Appearance: Normal	  HEENT:   Normal oral mucosa, PERRLA, EOMI	  Neck: Supple, - JVD, no carotid bruit  Cardiovascular: Normal S1 S2, No JVD, No murmurs, gallops or rubs.   Respiratory: Lungs clear to auscultation, No rales, rhonchi, wheezing  Gastrointestinal:  Soft, Non-tender, + BS	  Skin: No rashes, No ecchymoses, No cyanosis  Extremities: Normal range of motion, No clubbing, cyanosis or edema  Vascular: Peripheral pulses palpable 2+ bilaterally  Neurologic: Non-focal  Psychiatry: A & O x 3, Mood & affect appropriate      	    ECG:  	  RADIOLOGY:   DIAGNOSTIC TESTING:  [ ] Echocardiogram:   [ ]  Catheterization:  [ ] Stress Test:    [ ] YESSY  OTHER: 	    LABS:	 	  CARDIAC MARKERS:                        9.5    4.39  )-----------( 145      ( 01 Apr 2024 19:10 )             28.7     04-01    141  |  104  |  51<H>  ----------------------------<  140<H>  4.3   |  25  |  2.04<H>    Ca    9.2      01 Apr 2024 19:10  Mg     1.8     04-01    TPro  6.3  /  Alb  4.0  /  TBili  0.3  /  DBili  x   /  AST  16  /  ALT  13  /  AlkPhos  96  04-01    proBNP:   Lipid Profile:   HgA1c:   TSH:   PT/INR - ( 01 Apr 2024 19:10 )   PT: 10.9 sec;   INR: 0.95          PTT - ( 01 Apr 2024 19:10 )  PTT:33.2 sec Cardiology PA Adult Progress Note    Subjective Assessment: Patient seen and examined at bedside. Endorses a slight sensation of shortness of breath, feeling like he can't take a full breath. States this shortness of breath has been ongoing and is better than before. Denies active chest pain, palpitations, headache, nausea.   ROS negative except as noted above.   	  MEDICATIONS:  amLODIPine   Tablet 10 milliGRAM(s) Oral daily  escitalopram 20 milliGRAM(s) Oral daily  atorvastatin 40 milliGRAM(s) Oral at bedtime  dextrose 50% Injectable 12.5 Gram(s) IV Push once  dextrose 50% Injectable 25 Gram(s) IV Push once  dextrose 50% Injectable 25 Gram(s) IV Push once  dextrose Oral Gel 15 Gram(s) Oral once PRN  glucagon  Injectable 1 milliGRAM(s) IntraMuscular once  insulin lispro (ADMELOG) corrective regimen sliding scale SubCutaneous at bedtime  insulin lispro (ADMELOG) corrective regimen sliding scale SubCutaneous three times a day before meals  dextrose 5%. 1000 milliLiter(s) IV Continuous <Continuous>  dextrose 5%. 1000 milliLiter(s) IV Continuous <Continuous>  fluticasone propionate 50 MICROgram(s)/spray Nasal Spray 1 Spray(s) Both Nostrils two times a day  sodium chloride 0.9%. 1000 milliLiter(s) IV Continuous <Continuous>      	    [PHYSICAL EXAM:  TELEMETRY:  T(C): 36.4 (04-02-24 @ 06:09), Max: 37.1 (04-01-24 @ 17:14)  HR: 73 (04-02-24 @ 06:09) (69 - 99)  BP: 118/70 (04-02-24 @ 06:09) (107/59 - 124/77)  RR: 20 (04-02-24 @ 06:09) (16 - 20)  SpO2: 98% (04-02-24 @ 06:09) (97% - 98%)  Wt(kg): --  I&O's Summary    Height (cm): 160 (04-01 @ 22:09)  Weight (kg): 68.6 (04-01 @ 22:09)  BMI (kg/m2): 26.8 (04-01 @ 22:09)  BSA (m2): 1.72 (04-01 @ 22:09)                                        Appearance: Normal	  HEENT:   Normal oral mucosa, PERRLA, EOMI	  Neck: Supple, - JVD, no carotid bruit  Cardiovascular: Normal S1 S2, No JVD, systolic murmur appreciated.   Respiratory: Lungs clear to auscultation, No rales, rhonchi, wheezing  Gastrointestinal:  Soft, Non-tender, + BS	  Skin: No rashes, No ecchymoses, No cyanosis  Extremities: Normal range of motion, No clubbing, cyanosis or edema  Vascular: Peripheral pulses palpable 2+ bilaterally  Neurologic: Non-focal  Psychiatry: A & O x 3, Mood & affect appropriate      	    ECG:  	  RADIOLOGY:   DIAGNOSTIC TESTING:  [ ] Echocardiogram:   [ ]  Catheterization:  [ ] Stress Test:    [ ] YESSY  OTHER: 	    LABS:	 	  CARDIAC MARKERS:                        9.5    4.39  )-----------( 145      ( 01 Apr 2024 19:10 )             28.7     04-01    141  |  104  |  51<H>  ----------------------------<  140<H>  4.3   |  25  |  2.04<H>    Ca    9.2      01 Apr 2024 19:10  Mg     1.8     04-01    TPro  6.3  /  Alb  4.0  /  TBili  0.3  /  DBili  x   /  AST  16  /  ALT  13  /  AlkPhos  96  04-01    proBNP:   Lipid Profile:   HgA1c:   TSH:   PT/INR - ( 01 Apr 2024 19:10 )   PT: 10.9 sec;   INR: 0.95          PTT - ( 01 Apr 2024 19:10 )  PTT:33.2 sec Cardiology PA Adult Progress Note    Subjective Assessment: Patient seen and examined at bedside. Endorses a slight sensation of shortness of breath, feeling like he can't take a full breath. States this shortness of breath has been ongoing and is better than before. Denies active chest pain, palpitations, headache, nausea.     ROS negative except as noted above.   	  MEDICATIONS:  amLODIPine   Tablet 10 milliGRAM(s) Oral daily  escitalopram 20 milliGRAM(s) Oral daily  atorvastatin 40 milliGRAM(s) Oral at bedtime  dextrose 50% Injectable 12.5 Gram(s) IV Push once  dextrose 50% Injectable 25 Gram(s) IV Push once  dextrose 50% Injectable 25 Gram(s) IV Push once  dextrose Oral Gel 15 Gram(s) Oral once PRN  glucagon  Injectable 1 milliGRAM(s) IntraMuscular once  insulin lispro (ADMELOG) corrective regimen sliding scale SubCutaneous at bedtime  insulin lispro (ADMELOG) corrective regimen sliding scale SubCutaneous three times a day before meals  dextrose 5%. 1000 milliLiter(s) IV Continuous <Continuous>  dextrose 5%. 1000 milliLiter(s) IV Continuous <Continuous>  fluticasone propionate 50 MICROgram(s)/spray Nasal Spray 1 Spray(s) Both Nostrils two times a day  sodium chloride 0.9%. 1000 milliLiter(s) IV Continuous <Continuous>      	    [PHYSICAL EXAM:  TELEMETRY:  T(C): 36.4 (04-02-24 @ 06:09), Max: 37.1 (04-01-24 @ 17:14)  HR: 73 (04-02-24 @ 06:09) (69 - 99)  BP: 118/70 (04-02-24 @ 06:09) (107/59 - 124/77)  RR: 20 (04-02-24 @ 06:09) (16 - 20)  SpO2: 98% (04-02-24 @ 06:09) (97% - 98%)  Wt(kg): --  I&O's Summary    Height (cm): 160 (04-01 @ 22:09)  Weight (kg): 68.6 (04-01 @ 22:09)  BMI (kg/m2): 26.8 (04-01 @ 22:09)  BSA (m2): 1.72 (04-01 @ 22:09)                                        Appearance: Normal	  HEENT:   Normal oral mucosa, PERRLA, EOMI	  Neck: Supple, - JVD, no carotid bruit  Cardiovascular: Normal S1 S2, No JVD, systolic murmur appreciated.   Respiratory: Lungs clear to auscultation, No rales, rhonchi, wheezing  Gastrointestinal:  Soft, Non-tender, + BS	  Skin: No rashes, No ecchymoses, No cyanosis  Extremities: Normal range of motion, No clubbing, cyanosis or edema  Vascular: Peripheral pulses palpable 2+ bilaterally  Neurologic: Non-focal  Psychiatry: A & O x 3, Mood & affect appropriate      	    ECG:  	  RADIOLOGY:   DIAGNOSTIC TESTING:  [ ] Echocardiogram:   [ ]  Catheterization:  [ ] Stress Test:    [ ] YESSY  OTHER: 	    LABS:	 	  CARDIAC MARKERS:                        9.5    4.39  )-----------( 145      ( 01 Apr 2024 19:10 )             28.7     04-01    141  |  104  |  51<H>  ----------------------------<  140<H>  4.3   |  25  |  2.04<H>    Ca    9.2      01 Apr 2024 19:10  Mg     1.8     04-01    TPro  6.3  /  Alb  4.0  /  TBili  0.3  /  DBili  x   /  AST  16  /  ALT  13  /  AlkPhos  96  04-01    proBNP:   Lipid Profile:   HgA1c:   TSH:   PT/INR - ( 01 Apr 2024 19:10 )   PT: 10.9 sec;   INR: 0.95          PTT - ( 01 Apr 2024 19:10 )  PTT:33.2 sec **********TRANSFER TO CT SURGERY NOTE*************       Hospital Course: This is a 77 y/o male with HTN, DM-II, Hypercholesterolemia, prostate Ca (s/p radiation, no current treatment), CKD, presents to the [facility] ER with chest pain that started over last 4-5 weeks. Pain initially started while walking in [place], relieved with rest. Since then, he has been experiencing pain at rest and minimal exertion, increased in frequency and associated with SOB. Pain is rated 7-8/10, lasting a few minutes. He had an episode of nausea and vomiting day before admission.   He had a stress test on day of admission that was abnormal -- as per the cardiologist Dr Morton, pt achieved 4.5 mins on treadmill, reached target HR of 129bpm. He had chest discomfort at 3rd minute, EKG showed PVCs, no ST-T changes. Chest pain resolved once he stopped exercise. Shortly after, EKG showed slurred ST segments in inferolateral leads that took 7 mins to resolve. He was sent home with recommendation to return for a planned nuclear stress test the next day. However, he had multiple episodes of chest pain, SOB after leaving the office, therefore, he came to the ER for evaluation. In the ER, initial /73, HR 99, R 18, T 98.7, O2 sat 97% RA. Labs with H/H 9.5/28.7, Ptls 145, BUN/Cr 51/2.04, trop 15. He received Aspirin 325mg and Plavix 300mg load and admitted to telemetry for further evaluation. Patient is now s/p [facility] with Dr. Portillo 4/2/24: Diagnostic C 04/02/24: LM mild luminal, pLAD 60-70%, D1 mild diffuse disease L-L collaterals to LAD, mLAD  100%, prox-distal LCx 90%, OM1 mild diffuse disease, RCA mild diffuse diseaa. EDP 11 mmHg, Access: R Radial TR (due at 4PM), IC/Fellow: Lakhwinder/Jean, IVF: 232 cc/6 hrs. Patient is going being transferred to CT surgery service under Dr. Lopez for planned MIDCAB later this week.     Subjective Assessment: Patient seen and examined at bedside. Endorses a slight sensation of shortness of breath, feeling like he can't take a full breath. States this shortness of breath has been ongoing and is better than before. Denies active chest pain, palpitations, headache, nausea.     ROS negative except as noted above.   	  MEDICATIONS:  amLODIPine   Tablet 10 milliGRAM(s) Oral daily  escitalopram 20 milliGRAM(s) Oral daily  atorvastatin 40 milliGRAM(s) Oral at bedtime  dextrose 50% Injectable 12.5 Gram(s) IV Push once  dextrose 50% Injectable 25 Gram(s) IV Push once  dextrose 50% Injectable 25 Gram(s) IV Push once  dextrose Oral Gel 15 Gram(s) Oral once PRN  glucagon  Injectable 1 milliGRAM(s) IntraMuscular once  insulin lispro (ADMELOG) corrective regimen sliding scale SubCutaneous at bedtime  insulin lispro (ADMELOG) corrective regimen sliding scale SubCutaneous three times a day before meals  dextrose 5%. 1000 milliLiter(s) IV Continuous <Continuous>  dextrose 5%. 1000 milliLiter(s) IV Continuous <Continuous>  fluticasone propionate 50 MICROgram(s)/spray Nasal Spray 1 Spray(s) Both Nostrils two times a day  sodium chloride 0.9%. 1000 milliLiter(s) IV Continuous <Continuous>      	    [PHYSICAL EXAM:  TELEMETRY:  T(C): 36.4 (04-02-24 @ 06:09), Max: 37.1 (04-01-24 @ 17:14)  HR: 73 (04-02-24 @ 06:09) (69 - 99)  BP: 118/70 (04-02-24 @ 06:09) (107/59 - 124/77)  RR: 20 (04-02-24 @ 06:09) (16 - 20)  SpO2: 98% (04-02-24 @ 06:09) (97% - 98%)  Wt(kg): --  I&O's Summary    Height (cm): 160 (04-01 @ 22:09)  Weight (kg): 68.6 (04-01 @ 22:09)  BMI (kg/m2): 26.8 (04-01 @ 22:09)  BSA (m2): 1.72 (04-01 @ 22:09)                                        Appearance: Normal	  HEENT:   Normal oral mucosa, PERRLA, EOMI	  Neck: Supple, - JVD, no carotid bruit  Cardiovascular: Normal S1 S2, No JVD, systolic murmur appreciated.   Respiratory: Lungs clear to auscultation, No rales, rhonchi, wheezing  Gastrointestinal:  Soft, Non-tender, + BS	  Skin: No rashes, No ecchymoses, No cyanosis  Extremities: Normal range of motion, No clubbing, cyanosis or edema  Vascular: Peripheral pulses palpable 2+ bilaterally  Neurologic: Non-focal  Psychiatry: A & O x 3, Mood & affect appropriate      	    ECG:  	  RADIOLOGY:   DIAGNOSTIC TESTING:  [ ] Echocardiogram:   [ ]  Catheterization:  [ ] Stress Test:    [ ] YESSY  OTHER: 	    LABS:	 	  CARDIAC MARKERS:                        9.5    4.39  )-----------( 145      ( 01 Apr 2024 19:10 )             28.7     04-01    141  |  104  |  51<H>  ----------------------------<  140<H>  4.3   |  25  |  2.04<H>    Ca    9.2      01 Apr 2024 19:10  Mg     1.8     04-01    TPro  6.3  /  Alb  4.0  /  TBili  0.3  /  DBili  x   /  AST  16  /  ALT  13  /  AlkPhos  96  04-01    proBNP:   Lipid Profile:   HgA1c:   TSH:   PT/INR - ( 01 Apr 2024 19:10 )   PT: 10.9 sec;   INR: 0.95          PTT - ( 01 Apr 2024 19:10 )  PTT:33.2 sec

## 2024-04-02 NOTE — PROGRESS NOTE ADULT - PROBLEM SELECTOR PLAN 3
BUN 51, Cr 2.04 on admission  Unknown baseline  IV hydration given overnight for cath BUN 51, Cr 2.04 on admission  -sCr this AM: 1.74 sp IVF   -per son at bedside, most recent outpatinet labs c/w sCr baseline 1.7-2   avoid nephrotic agents and cont to monitor

## 2024-04-02 NOTE — CONSULT NOTE ADULT - ASSESSMENT
This is a 79 y/o male with HTN, DM-II, Hypercholesterolemia, prostate Ca (s/p radiation, no current treatment), CKD, presents to the [facility] ER with chest pain that started over last 4-5 weeks. Pain initially started while walking in [place], relieved with rest. Since then, he has been experiencing pain at rest and minimal exertion, increased in frequency and associated with SOB. Pain is rated 7-8/10, lasting a few minutes. He had an episode of nausea and vomiting day before admission.   He had a stress test on day of admission that was abnormal -- as per the cardiologist Dr Morton, pt achieved 4.5 mins on treadmill, reached target HR of 129bpm. He had chest discomfort at 3rd minute, EKG showed PVCs, no ST-T changes. Chest pain resolved once he stopped exercise. Shortly after, EKG showed slurred ST segments in inferolateral leads that took 7 mins to resolve. He was sent home with recommendation to return for a planned nuclear stress test the next day. However, he had multiple episodes of chest pain, SOB after leaving the office, therefore, he came to the ER for evaluation.    Pt underwent cath on 4/2 which revealed 2vCAD so was transferred to CTS service for evaluation for MIDCAB.      Plan:    Neurovascular:   -Pain well controlled with current regimen. PRN's: tylenol    Cardiovascular:   -Hemodynamically stable.   -Monitor: BP, HR, tele  -2v CAD s/p cath    -transfer to CTS service for eval for CABG this week with Dr. Lopez    -c/w ASA    -hold plavix pre op  -HTN    -metoprolol 12.5 mg TID  -HLD    -c/w lipitor    Respiratory:   -Oxygenating well on room air  -Encourage continued use of IS 10x/hr and frequent ambulation  -CXR: WNL    GI:  -GI PPX: protonix  -PO Diet  -Bowel Regimen: miralax    Renal / :  -Continue to monitor renal function: BUN/Cr 44/1.74    -continue to trend  -Monitor I/O's daily     Endocrine:    -DM  -A1c: 5.3    -c/w ISS  -TSH: pending    Hematologic:  -CBC: H/H- 9.9/30  -Coagulation Panel.    ID:  -Temperature: afebrile  -CBC: WBC-4  -Continue to observe for SIRS/Sepsis Syndrome.    Prophylaxis:  -DVT prophylaxis with 5000 SubQ Heparin q8h.  -Continue with SCD's b/l while patient is at rest     Disposition:  -Likely CABG later this week

## 2024-04-02 NOTE — CONSULT NOTE ADULT - SUBJECTIVE AND OBJECTIVE BOX
Surgeon/Attending MD: John    Requesting Physician: June    HISTORY OF PRESENT ILLNESS (Need 4):    This is a 77 y/o male with HTN, DM-II, Hypercholesterolemia, prostate Ca (s/p radiation, no current treatment), CKD, presents to the [facility] ER with chest pain that started over last 4-5 weeks. Pain initially started while walking in [place], relieved with rest. Since then, he has been experiencing pain at rest and minimal exertion, increased in frequency and associated with SOB. Pain is rated 7-8/10, lasting a few minutes. He had an episode of nausea and vomiting day before admission.   He had a stress test on day of admission that was abnormal -- as per the cardiologist Dr Morton, pt achieved 4.5 mins on treadmill, reached target HR of 129bpm. He had chest discomfort at 3rd minute, EKG showed PVCs, no ST-T changes. Chest pain resolved once he stopped exercise. Shortly after, EKG showed slurred ST segments in inferolateral leads that took 7 mins to resolve. He was sent home with recommendation to return for a planned nuclear stress test the next day. However, he had multiple episodes of chest pain, SOB after leaving the office, therefore, he came to the ER for evaluation.    Pt underwent cath on 4/2 which revealed 2vCAD so was transferred to CTS service for evaluation for MIDCAB.      PAST MEDICAL & SURGICAL HISTORY:  Gout      Prostate cancer      IBS (irritable bowel syndrome)      Hypertension      Diabetes      Hypercholesterolemia      H/O arthritis      Genital herpes      Obstructive sleep apnea      Shingles      History of tonsillectomy      S/P tooth extraction      Injury of meniscus of left knee          MEDICATIONS  (STANDING):  allopurinol 300 milliGRAM(s) Oral daily  aspirin enteric coated 81 milliGRAM(s) Oral daily  atorvastatin 40 milliGRAM(s) Oral at bedtime  buPROPion XL (24-Hour) . 150 milliGRAM(s) Oral daily  dextrose 5%. 1000 milliLiter(s) (50 mL/Hr) IV Continuous <Continuous>  dextrose 5%. 1000 milliLiter(s) (100 mL/Hr) IV Continuous <Continuous>  dextrose 50% Injectable 25 Gram(s) IV Push once  dextrose 50% Injectable 12.5 Gram(s) IV Push once  dextrose 50% Injectable 25 Gram(s) IV Push once  escitalopram 20 milliGRAM(s) Oral daily  fluticasone propionate 50 MICROgram(s)/spray Nasal Spray 1 Spray(s) Both Nostrils two times a day  glucagon  Injectable 1 milliGRAM(s) IntraMuscular once  insulin lispro (ADMELOG) corrective regimen sliding scale   SubCutaneous three times a day before meals  insulin lispro (ADMELOG) corrective regimen sliding scale   SubCutaneous at bedtime  metoprolol tartrate 12.5 milliGRAM(s) Oral every 8 hours  pantoprazole    Tablet 40 milliGRAM(s) Oral before breakfast  polyethylene glycol 3350 17 Gram(s) Oral daily  sodium chloride 0.9%. 1000 milliLiter(s) (230 mL/Hr) IV Continuous <Continuous>    MEDICATIONS  (PRN):  acetaminophen     Tablet .. 650 milliGRAM(s) Oral every 6 hours PRN Mild Pain (1 - 3)  dextrose Oral Gel 15 Gram(s) Oral once PRN Blood Glucose LESS THAN 70 milliGRAM(s)/deciliter      Allergies    Levaquin (Rash)  clarithromycin (Other)  penicillins (Unknown)    Intolerances        SOCIAL HISTORY:  Smoker:  NO          ETOH use:  YES-socially  Ilicit Drug use:   NO      FAMILY HISTORY:  FH: CAD (coronary artery disease) (Father, Sibling)        Review of Systems (Need 10):  CONSTITUTIONAL: Denies fevers / chills, sweats, fatigue, weight loss, weight gain                                       NEURO:  Denies parathesias, seizures, syncope, confusion                                                                                  EYES:  Denies blurry vision, discharge, pain, loss of vision                                                                                    ENMT:  Denies difficulty hearing, vertigo, dysphagia, epistaxis, recent dental work                                       CV:  mild chest discomfort                                                                                          RESPIRATORY:  Denies wWheezing, SOB, cough / sputum, hemoptysis                                                               GI:  Denies nausea, vomiting, diarrhea, constipation, melena                                                                          : Denies hematuria, dysuria, urgency, incontinence                                                                                          MUSKULOSKELETAL:  Denies arthritis, joint swelling, muscle weakness                                                             SKIN/BREAST:  Denies rash, itching, hair loss, masses                                                                                              PSYCH:  Denies depression, anxiety, suicidal ideation                                                                                                HEME/LYMPH:  Denies bruises easily, enlarged lymph nodes, tender lymph nodes                                          ENDOCRINE:  Denies cold intolerance, heat intolerance, polydipsia                                                                      Vital Signs Last 24 Hrs  T(C): 36.7 (02 Apr 2024 08:31), Max: 37.1 (01 Apr 2024 17:14)  T(F): 98 (02 Apr 2024 08:31), Max: 98.7 (01 Apr 2024 17:14)  HR: 68 (02 Apr 2024 12:04) (68 - 99)  BP: 118/68 (02 Apr 2024 12:04) (107/59 - 126/72)  BP(mean): 89 (02 Apr 2024 06:09) (80 - 91)  RR: 19 (02 Apr 2024 12:04) (16 - 20)  SpO2: 98% (02 Apr 2024 12:04) (96% - 99%)    Parameters below as of 02 Apr 2024 12:04  Patient On (Oxygen Delivery Method): room air    PHYSICAL EXAM:  General: resting comfortably in bed in NAD  Neurological: AOx3. Motor skills grossly intact  Cardiovascular: Normal S1/S2. Regular rate/rhythm. No murmurs  Respiratory: Lungs CTA bilaterally. No wheezing or rales  Gastrointestinal: +BS in all 4 quadrants. Non-distended. Soft. Non-tender  Extremities: Strength 5/5 b/l upper/lower extremities. Sensation grossly intact upper/lower extremities. No edema. No calf tenderness.  Vascular: Radial 2+bilaterally, DP 2+ b/l  Incision Sites: NA                                                                  LABS:                        9.9    4.06  )-----------( 142      ( 02 Apr 2024 09:09 )             30.6     04-02    143  |  108  |  44<H>  ----------------------------<  91  4.2   |  25  |  1.74<H>    Ca    9.2      02 Apr 2024 09:09  Mg     2.0     04-02    TPro  6.3  /  Alb  4.0  /  TBili  0.3  /  DBili  x   /  AST  16  /  ALT  13  /  AlkPhos  96  04-01    PT/INR - ( 01 Apr 2024 19:10 )   PT: 10.9 sec;   INR: 0.95          PTT - ( 01 Apr 2024 19:10 )  PTT:33.2 sec  Urinalysis Basic - ( 02 Apr 2024 09:09 )    Color: x / Appearance: x / SG: x / pH: x  Gluc: 91 mg/dL / Ketone: x  / Bili: x / Urobili: x   Blood: x / Protein: x / Nitrite: x   Leuk Esterase: x / RBC: x / WBC x   Sq Epi: x / Non Sq Epi: x / Bacteria: x      CARDIAC MARKERS ( 01 Apr 2024 22:26 )  x     / x     / 36 U/L / x     / 1.9 ng/mL  CARDIAC MARKERS ( 01 Apr 2024 19:10 )  x     / x     / 34 U/L / x     / 1.9 ng/mL          RADIOLOGY & ADDITIONAL STUDIES:

## 2024-04-02 NOTE — PROGRESS NOTE ADULT - PROBLEM SELECTOR PLAN 7
Completed treatment at [facility], now in remission  Continue to monitor Completed treatment at [facility] [place], now in remission  Continue to monitor    F: None  E: Replete if K<4 or Mag<2  N: DASH Diet  GIppx: none   VTEppx: heparin SQ ______ASK ABOUT PRE OP??????   Dispo: pending clinical course

## 2024-04-03 LAB
ADD ON TEST-SPECIMEN IN LAB: SIGNIFICANT CHANGE UP
ANION GAP SERPL CALC-SCNC: 13 MMOL/L — SIGNIFICANT CHANGE UP (ref 5–17)
BUN SERPL-MCNC: 50 MG/DL — HIGH (ref 7–23)
CALCIUM SERPL-MCNC: 8.6 MG/DL — SIGNIFICANT CHANGE UP (ref 8.4–10.5)
CHLORIDE SERPL-SCNC: 109 MMOL/L — HIGH (ref 96–108)
CK SERPL-CCNC: 28 U/L — LOW (ref 30–200)
CO2 SERPL-SCNC: 20 MMOL/L — LOW (ref 22–31)
CREAT SERPL-MCNC: 2.05 MG/DL — HIGH (ref 0.5–1.3)
EGFR: 33 ML/MIN/1.73M2 — LOW
FERRITIN SERPL-MCNC: 96 NG/ML — SIGNIFICANT CHANGE UP (ref 30–400)
GLUCOSE BLDC GLUCOMTR-MCNC: 105 MG/DL — HIGH (ref 70–99)
GLUCOSE BLDC GLUCOMTR-MCNC: 259 MG/DL — HIGH (ref 70–99)
GLUCOSE BLDC GLUCOMTR-MCNC: 89 MG/DL — SIGNIFICANT CHANGE UP (ref 70–99)
GLUCOSE BLDC GLUCOMTR-MCNC: 94 MG/DL — SIGNIFICANT CHANGE UP (ref 70–99)
GLUCOSE SERPL-MCNC: 95 MG/DL — SIGNIFICANT CHANGE UP (ref 70–99)
HCT VFR BLD CALC: 28.1 % — LOW (ref 39–50)
HGB BLD-MCNC: 9 G/DL — LOW (ref 13–17)
IRON SATN MFR SERPL: 29 % — SIGNIFICANT CHANGE UP (ref 16–55)
IRON SATN MFR SERPL: 70 UG/DL — SIGNIFICANT CHANGE UP (ref 45–165)
MAGNESIUM SERPL-MCNC: 1.8 MG/DL — SIGNIFICANT CHANGE UP (ref 1.6–2.6)
MCHC RBC-ENTMCNC: 30.5 PG — SIGNIFICANT CHANGE UP (ref 27–34)
MCHC RBC-ENTMCNC: 32 GM/DL — SIGNIFICANT CHANGE UP (ref 32–36)
MCV RBC AUTO: 95.3 FL — SIGNIFICANT CHANGE UP (ref 80–100)
NRBC # BLD: 0 /100 WBCS — SIGNIFICANT CHANGE UP (ref 0–0)
PA ADP PRP-ACNC: 224 PRU — SIGNIFICANT CHANGE UP (ref 194–418)
PLATELET # BLD AUTO: 122 K/UL — LOW (ref 150–400)
POTASSIUM SERPL-MCNC: 4.1 MMOL/L — SIGNIFICANT CHANGE UP (ref 3.5–5.3)
POTASSIUM SERPL-SCNC: 4.1 MMOL/L — SIGNIFICANT CHANGE UP (ref 3.5–5.3)
RBC # BLD: 2.95 M/UL — LOW (ref 4.2–5.8)
RBC # BLD: 2.95 M/UL — LOW (ref 4.2–5.8)
RBC # FLD: 14.4 % — SIGNIFICANT CHANGE UP (ref 10.3–14.5)
RETICS #: 35.1 K/UL — SIGNIFICANT CHANGE UP (ref 25–125)
RETICS/RBC NFR: 1.2 % — SIGNIFICANT CHANGE UP (ref 0.5–2.5)
SODIUM SERPL-SCNC: 142 MMOL/L — SIGNIFICANT CHANGE UP (ref 135–145)
TIBC SERPL-MCNC: 240 UG/DL — SIGNIFICANT CHANGE UP (ref 220–430)
TRANSFERRIN SERPL-MCNC: 190 MG/DL — LOW (ref 200–360)
TSH SERPL-MCNC: 2.37 UIU/ML — SIGNIFICANT CHANGE UP (ref 0.27–4.2)
UIBC SERPL-MCNC: 170 UG/DL — SIGNIFICANT CHANGE UP (ref 110–370)
WBC # BLD: 4.45 K/UL — SIGNIFICANT CHANGE UP (ref 3.8–10.5)
WBC # FLD AUTO: 4.45 K/UL — SIGNIFICANT CHANGE UP (ref 3.8–10.5)

## 2024-04-03 PROCEDURE — 71275 CT ANGIOGRAPHY CHEST: CPT | Mod: 26

## 2024-04-03 PROCEDURE — 74174 CTA ABD&PLVS W/CONTRAST: CPT | Mod: 26

## 2024-04-03 PROCEDURE — 93010 ELECTROCARDIOGRAM REPORT: CPT

## 2024-04-03 PROCEDURE — 75572 CT HRT W/3D IMAGE: CPT | Mod: 26

## 2024-04-03 RX ORDER — SODIUM CHLORIDE 9 MG/ML
1000 INJECTION INTRAMUSCULAR; INTRAVENOUS; SUBCUTANEOUS
Refills: 0 | Status: DISCONTINUED | OUTPATIENT
Start: 2024-04-03 | End: 2024-04-08

## 2024-04-03 RX ORDER — COLCHICINE 0.6 MG
0.6 TABLET ORAL DAILY
Refills: 0 | Status: DISCONTINUED | OUTPATIENT
Start: 2024-04-03 | End: 2024-04-03

## 2024-04-03 RX ORDER — MAGNESIUM OXIDE 400 MG ORAL TABLET 241.3 MG
800 TABLET ORAL ONCE
Refills: 0 | Status: COMPLETED | OUTPATIENT
Start: 2024-04-03 | End: 2024-04-03

## 2024-04-03 RX ADMIN — MAGNESIUM OXIDE 400 MG ORAL TABLET 800 MILLIGRAM(S): 241.3 TABLET ORAL at 13:43

## 2024-04-03 RX ADMIN — Medication 12.5 MILLIGRAM(S): at 10:46

## 2024-04-03 RX ADMIN — PANTOPRAZOLE SODIUM 40 MILLIGRAM(S): 20 TABLET, DELAYED RELEASE ORAL at 05:52

## 2024-04-03 RX ADMIN — Medication 81 MILLIGRAM(S): at 13:42

## 2024-04-03 RX ADMIN — Medication 1: at 22:35

## 2024-04-03 RX ADMIN — Medication 1 SPRAY(S): at 05:51

## 2024-04-03 RX ADMIN — HEPARIN SODIUM 5000 UNIT(S): 5000 INJECTION INTRAVENOUS; SUBCUTANEOUS at 22:35

## 2024-04-03 RX ADMIN — ATORVASTATIN CALCIUM 40 MILLIGRAM(S): 80 TABLET, FILM COATED ORAL at 22:36

## 2024-04-03 RX ADMIN — ESCITALOPRAM OXALATE 20 MILLIGRAM(S): 10 TABLET, FILM COATED ORAL at 22:36

## 2024-04-03 RX ADMIN — BUPROPION HYDROCHLORIDE 150 MILLIGRAM(S): 150 TABLET, EXTENDED RELEASE ORAL at 13:43

## 2024-04-03 RX ADMIN — Medication 1 SPRAY(S): at 20:44

## 2024-04-03 RX ADMIN — SODIUM CHLORIDE 50 MILLILITER(S): 9 INJECTION INTRAMUSCULAR; INTRAVENOUS; SUBCUTANEOUS at 10:45

## 2024-04-03 RX ADMIN — HEPARIN SODIUM 5000 UNIT(S): 5000 INJECTION INTRAVENOUS; SUBCUTANEOUS at 13:42

## 2024-04-03 RX ADMIN — Medication 12.5 MILLIGRAM(S): at 20:44

## 2024-04-03 RX ADMIN — HEPARIN SODIUM 5000 UNIT(S): 5000 INJECTION INTRAVENOUS; SUBCUTANEOUS at 05:52

## 2024-04-03 RX ADMIN — POLYETHYLENE GLYCOL 3350 17 GRAM(S): 17 POWDER, FOR SOLUTION ORAL at 13:43

## 2024-04-03 RX ADMIN — Medication 300 MILLIGRAM(S): at 13:42

## 2024-04-03 NOTE — PROGRESS NOTE ADULT - ASSESSMENT
A: HTN, DM-II, HPL, prostate cancer, presents to the ER with recurrent chest pain and SOB after abnormal outpatient exercise stress test  04/01/24 with residual symptoms at home after procedure. Patient loaded with ASA and Plavix in ED, planned for cath 04/02, Course c/b Cr 2.01 Bun 51 now s/p pre-cath fluids. C/b anemia (at baseline), history of tarry stools with outpatient Colonoscopy/EGD planned for April with [facility]. Planned for C with Dr. Portillo today to r/o ACS i/s/o unstable angina and abnormal exercise stress test. Diagnostic LHC 04/02/24: LM mild luminal, pLAD 60-70%, D1 mild diffuse disease L-L collaterals to LAD, mLAD  100%, prox-distal LCx 90%, OM1 mild diffuse disease, RCA mild diffuse diseaa. EDP 11 mmHg. Patient admitted to CT surgery service for CABG workup with Dr. Lopez. Found to have severe AS on ECHO and now being worked up for potential TAVR?       P:  Neurovascular: No delirium. Pain well controlled with current regimen.  -PRN's: Tylenol  - c/w home meds: welbutrin and lexapro    Cardiovascular: Hemodynamically stable. HR controlled.  - surgical plan pending  - ECHO: severe AS --> pending TAVR scans  - CAD: ASA/statin/ metop 12.5 Q8  - 8 beats VTACH this AM- hemodynamically stable, EKG in sinus cornel.     Respiratory: 02 Sat = 98% on RA.  -If on oxygen wean to RA from for O2 Sat > 93%.  -Encourage C+DB and Use of IS 10x / hr while awake.  -CXR: no ptx or effusion     GI: Stable.  - hx IBS-C  -PPX: protonix  - bowel reg: miralax   -PO Diet. (NPO for TAVR scans)     Renal / : HX CKD   -Monitor renal function (BUN/Cr: 50/2.05)  -Monitor I/O's.  - gentle hydration (50/hr NS)  - Hx Gout: c/w home allopurinol and colchicine     Endocrine: hx DM, on ISS  -A1c: 5.3  -TSH: 2.370    Hematologic:  -CBC: 9/28.1  -Coagulation Panel: PT/INR - ( 01 Apr 2024 19:10 )   PT: 10.9 sec;   INR: 0.95     PTT - ( 01 Apr 2024 19:10 )  PTT:33.2 sec    ID:  -afebrile  -WBC: 4.45    Prophylaxis:  -DVT prophylaxis with 5000 SubQ Heparin q8h.  -SCD's    Disposition:  - potential OR, surgical plan pending    A: HTN, DM-II, HPL, prostate cancer, presents to the ER with recurrent chest pain and SOB after abnormal outpatient exercise stress test  04/01/24 with residual symptoms at home after procedure. Patient loaded with ASA and Plavix in ED, planned for cath 04/02, Course c/b Cr 2.01 Bun 51 now s/p pre-cath fluids. C/b anemia (at baseline), history of tarry stools with outpatient Colonoscopy/EGD planned for April with [facility]. Planned for C with Dr. Portillo today to r/o ACS i/s/o unstable angina and abnormal exercise stress test. Diagnostic LHC 04/02/24: LM mild luminal, pLAD 60-70%, D1 mild diffuse disease L-L collaterals to LAD, mLAD  100%, prox-distal LCx 90%, OM1 mild diffuse disease, RCA mild diffuse diseaa. EDP 11 mmHg. Patient admitted to CT surgery service for CAD and severe AS, surgical plan pending.     P:  Neurovascular: No delirium. Pain well controlled with current regimen.  -PRN's: Tylenol  - c/w home meds: welbutrin and lexapro    Cardiovascular: Hemodynamically stable. HR controlled.  - surgical plan pending  - ECHO: severe AS --> pending TAVR scans  - CAD: ASA/statin/ metop 12.5 Q8  - 8 beats VTACH this AM- hemodynamically stable, EKG in sinus cornel.     Respiratory: 02 Sat = 98% on RA.  -If on oxygen wean to RA from for O2 Sat > 93%.  -Encourage C+DB and Use of IS 10x / hr while awake.  -CXR: no ptx or effusion     GI: Stable.  - hx IBS-C  -PPX: protonix  - bowel reg: miralax   -PO Diet. (NPO for TAVR scans)     Renal / : HX CKD   -Monitor renal function (BUN/Cr: 50/2.05)  -Monitor I/O's.  - gentle hydration (50/hr NS)  - Hx Gout: c/w home allopurinol and colchicine     Endocrine: hx DM, on ISS  -A1c: 5.3  -TSH: 2.370    Hematologic:  -CBC: 9/28.1  -Coagulation Panel: PT/INR - ( 01 Apr 2024 19:10 )   PT: 10.9 sec;   INR: 0.95     PTT - ( 01 Apr 2024 19:10 )  PTT:33.2 sec    ID:  -afebrile  -WBC: 4.45    Prophylaxis:  -DVT prophylaxis with 5000 SubQ Heparin q8h.  -SCD's    Disposition:  - potential OR, surgical plan pending    A: 78 y.o M with PMHX HTN, DM-II, HLD, CKD (baseline Cr 1.7-2.0), prostate cancer, who initially presented to ED with chest pain and SOB that started after abnormal outpatient exercise stress test  04/01/24 with residual symptoms at home after procedure. He was admitted to cardiology and on 4/2 had cardiac cath that revealed pLAD 60-70%, D1 mild diffuse disease L-L collaterals to LAD, mLAD  100%, prox-distal LCx 90%, OM1 mild diffuse disease, RCA mild diffuse disease. Patient admitted to CT surgery service for CAD and severe AS, surgical plan pending.     P:  Neurovascular: No delirium. Pain well controlled with current regimen.  -PRN's: Tylenol  - c/w home meds: welbutrin and lexapro    Cardiovascular: Hemodynamically stable. HR controlled.  - surgical plan pending  - ECHO: severe AS --> pending TAVR scans  - CAD: ASA/statin  - pre op CABG: continue metoprolol 12.5 q12  - 8 beats VTACH this AM- hemodynamically stable, EKG in sinus cornel.     Respiratory: 02 Sat = 98% on RA.  -If on oxygen wean to RA from for O2 Sat > 93%.  -Encourage C+DB and Use of IS 10x / hr while awake.  -CXR: no ptx or effusion     GI: Stable.  - hx IBS-C  -PPX: protonix  - bowel reg: miralax   -PO Diet. (NPO for TAVR scans)     Renal / : HX CKD   -Monitor renal function (BUN/Cr: 50/2.05)  -Monitor I/O's.  - gentle hydration (50/hr NS) pre TAVR CT scans given CKD  - Hx Gout: c/w home allopurinol    Endocrine: hx DM, on ISS  -A1c: 5.3  -TSH: 2.370    Hematologic:  -CBC: 9/28.1  -Coagulation Panel: PT/INR - ( 01 Apr 2024 19:10 )   PT: 10.9 sec;   INR: 0.95     PTT - ( 01 Apr 2024 19:10 )  PTT:33.2 sec    ID:  -afebrile  -WBC: 4.45    Prophylaxis:  -DVT prophylaxis with 5000 SubQ Heparin q8h.  -SCD's    Disposition:  - potential OR, surgical plan pending

## 2024-04-03 NOTE — PROGRESS NOTE ADULT - SUBJECTIVE AND OBJECTIVE BOX
Patient discussed on morning rounds with Dr. Lopez    Operation / Date: Pre op for MIDCAB, potential TAVR tomorrow     SUBJECTIVE ASSESSMENT:  78y Male seen and assessed at bedside this morning. Patient feels overall well and has no acute complaints.       Vital Signs Last 24 Hrs  T(C): 36.4 (03 Apr 2024 05:51), Max: 36.6 (02 Apr 2024 20:59)  T(F): 97.5 (03 Apr 2024 05:51), Max: 97.8 (02 Apr 2024 20:59)  HR: 54 (03 Apr 2024 12:02) (54 - 75)  BP: 107/59 (03 Apr 2024 12:02) (98/59 - 122/65)  BP(mean): 79 (03 Apr 2024 12:02) (74 - 79)  RR: 16 (03 Apr 2024 12:02) (16 - 20)  SpO2: 100% (03 Apr 2024 12:02) (95% - 100%)    Parameters below as of 03 Apr 2024 12:02  Patient On (Oxygen Delivery Method): room air      I&O's Detail    02 Apr 2024 07:01  -  03 Apr 2024 07:00  --------------------------------------------------------  IN:  Total IN: 0 mL    OUT:    Oral Fluid: 0 mL    Voided (mL): 925 mL  Total OUT: 925 mL    Total NET: -925 mL      03 Apr 2024 07:01  -  03 Apr 2024 14:13  --------------------------------------------------------  IN:    Oral Fluid: 180 mL  Total IN: 180 mL    OUT:    Voided (mL): 1525 mL  Total OUT: 1525 mL    Total NET: -1345 mL      PHYSICAL EXAM:  General: NAD  Neurological: AOx3. Motor skills grossly intact  Cardiovascular: Normal S1/S2. Regular rate/rhythm. AS murmur noted   Respiratory: Lungs CTA bilaterally. No wheezing or rales  Gastrointestinal: +BS in all 4 quadrants. Non-distended. Soft. Non-tender  Extremities: Strength 5/5 b/l upper/lower extremities. Sensation grossly intact upper/lower extremities. No edema. No calf tenderness.  Vascular: Radial 2+bilaterally, DP 2+ b/l  Skin: no rashes noted       LABS:                        9.0    4.45  )-----------( 122      ( 03 Apr 2024 05:30 )             28.1       PT/INR - ( 01 Apr 2024 19:10 )   PT: 10.9 sec;   INR: 0.95          PTT - ( 01 Apr 2024 19:10 )  PTT:33.2 sec    04-03    142  |  109<H>  |  50<H>  ----------------------------<  95  4.1   |  20<L>  |  2.05<H>    Ca    8.6      03 Apr 2024 05:30  Mg     1.8     04-03    TPro  6.3  /  Alb  4.0  /  TBili  0.3  /  DBili  x   /  AST  16  /  ALT  13  /  AlkPhos  96  04-01      Urinalysis Basic - ( 03 Apr 2024 05:30 )    Color: x / Appearance: x / SG: x / pH: x  Gluc: 95 mg/dL / Ketone: x  / Bili: x / Urobili: x   Blood: x / Protein: x / Nitrite: x   Leuk Esterase: x / RBC: x / WBC x   Sq Epi: x / Non Sq Epi: x / Bacteria: x        MEDICATIONS  (STANDING):  allopurinol 300 milliGRAM(s) Oral daily  aspirin enteric coated 81 milliGRAM(s) Oral daily  atorvastatin 40 milliGRAM(s) Oral at bedtime  buPROPion XL (24-Hour) . 150 milliGRAM(s) Oral daily  dextrose 5%. 1000 milliLiter(s) (100 mL/Hr) IV Continuous <Continuous>  dextrose 5%. 1000 milliLiter(s) (50 mL/Hr) IV Continuous <Continuous>  dextrose 50% Injectable 12.5 Gram(s) IV Push once  dextrose 50% Injectable 25 Gram(s) IV Push once  dextrose 50% Injectable 25 Gram(s) IV Push once  escitalopram 20 milliGRAM(s) Oral daily  fluticasone propionate 50 MICROgram(s)/spray Nasal Spray 1 Spray(s) Both Nostrils two times a day  glucagon  Injectable 1 milliGRAM(s) IntraMuscular once  heparin   Injectable 5000 Unit(s) SubCutaneous every 8 hours  insulin lispro (ADMELOG) corrective regimen sliding scale   SubCutaneous at bedtime  insulin lispro (ADMELOG) corrective regimen sliding scale   SubCutaneous three times a day before meals  metoprolol tartrate 12.5 milliGRAM(s) Oral every 8 hours  pantoprazole    Tablet 40 milliGRAM(s) Oral before breakfast  polyethylene glycol 3350 17 Gram(s) Oral daily  sodium chloride 0.9%. 1000 milliLiter(s) (50 mL/Hr) IV Continuous <Continuous>  sodium chloride 0.9%. 1000 milliLiter(s) (230 mL/Hr) IV Continuous <Continuous>    MEDICATIONS  (PRN):  acetaminophen     Tablet .. 650 milliGRAM(s) Oral every 6 hours PRN Mild Pain (1 - 3)  dextrose Oral Gel 15 Gram(s) Oral once PRN Blood Glucose LESS THAN 70 milliGRAM(s)/deciliter        RADIOLOGY & ADDITIONAL TESTS:     Patient discussed on morning rounds with Dr. Lopez    reason for admission: CAD and severe AS    SUBJECTIVE ASSESSMENT:  78y Male seen and assessed at bedside this morning. Patient feels overall well and has no acute complaints.       Vital Signs Last 24 Hrs  T(C): 36.4 (03 Apr 2024 05:51), Max: 36.6 (02 Apr 2024 20:59)  T(F): 97.5 (03 Apr 2024 05:51), Max: 97.8 (02 Apr 2024 20:59)  HR: 54 (03 Apr 2024 12:02) (54 - 75)  BP: 107/59 (03 Apr 2024 12:02) (98/59 - 122/65)  BP(mean): 79 (03 Apr 2024 12:02) (74 - 79)  RR: 16 (03 Apr 2024 12:02) (16 - 20)  SpO2: 100% (03 Apr 2024 12:02) (95% - 100%)    Parameters below as of 03 Apr 2024 12:02  Patient On (Oxygen Delivery Method): room air      I&O's Detail    02 Apr 2024 07:01  -  03 Apr 2024 07:00  --------------------------------------------------------  IN:  Total IN: 0 mL    OUT:    Oral Fluid: 0 mL    Voided (mL): 925 mL  Total OUT: 925 mL    Total NET: -925 mL      03 Apr 2024 07:01  -  03 Apr 2024 14:13  --------------------------------------------------------  IN:    Oral Fluid: 180 mL  Total IN: 180 mL    OUT:    Voided (mL): 1525 mL  Total OUT: 1525 mL    Total NET: -1345 mL      PHYSICAL EXAM:  General: NAD  Neurological: AOx3. Motor skills grossly intact  Cardiovascular: Normal S1/S2. Regular rate/rhythm. AS murmur noted   Respiratory: Lungs CTA bilaterally. No wheezing or rales  Gastrointestinal: +BS in all 4 quadrants. Non-distended. Soft. Non-tender  Extremities: Strength 5/5 b/l upper/lower extremities. Sensation grossly intact upper/lower extremities. No edema. No calf tenderness.  Vascular: Radial 2+bilaterally, DP 2+ b/l  Skin: no rashes noted       LABS:                        9.0    4.45  )-----------( 122      ( 03 Apr 2024 05:30 )             28.1       PT/INR - ( 01 Apr 2024 19:10 )   PT: 10.9 sec;   INR: 0.95          PTT - ( 01 Apr 2024 19:10 )  PTT:33.2 sec    04-03    142  |  109<H>  |  50<H>  ----------------------------<  95  4.1   |  20<L>  |  2.05<H>    Ca    8.6      03 Apr 2024 05:30  Mg     1.8     04-03    TPro  6.3  /  Alb  4.0  /  TBili  0.3  /  DBili  x   /  AST  16  /  ALT  13  /  AlkPhos  96  04-01      Urinalysis Basic - ( 03 Apr 2024 05:30 )    Color: x / Appearance: x / SG: x / pH: x  Gluc: 95 mg/dL / Ketone: x  / Bili: x / Urobili: x   Blood: x / Protein: x / Nitrite: x   Leuk Esterase: x / RBC: x / WBC x   Sq Epi: x / Non Sq Epi: x / Bacteria: x        MEDICATIONS  (STANDING):  allopurinol 300 milliGRAM(s) Oral daily  aspirin enteric coated 81 milliGRAM(s) Oral daily  atorvastatin 40 milliGRAM(s) Oral at bedtime  buPROPion XL (24-Hour) . 150 milliGRAM(s) Oral daily  dextrose 5%. 1000 milliLiter(s) (100 mL/Hr) IV Continuous <Continuous>  dextrose 5%. 1000 milliLiter(s) (50 mL/Hr) IV Continuous <Continuous>  dextrose 50% Injectable 12.5 Gram(s) IV Push once  dextrose 50% Injectable 25 Gram(s) IV Push once  dextrose 50% Injectable 25 Gram(s) IV Push once  escitalopram 20 milliGRAM(s) Oral daily  fluticasone propionate 50 MICROgram(s)/spray Nasal Spray 1 Spray(s) Both Nostrils two times a day  glucagon  Injectable 1 milliGRAM(s) IntraMuscular once  heparin   Injectable 5000 Unit(s) SubCutaneous every 8 hours  insulin lispro (ADMELOG) corrective regimen sliding scale   SubCutaneous at bedtime  insulin lispro (ADMELOG) corrective regimen sliding scale   SubCutaneous three times a day before meals  metoprolol tartrate 12.5 milliGRAM(s) Oral every 8 hours  pantoprazole    Tablet 40 milliGRAM(s) Oral before breakfast  polyethylene glycol 3350 17 Gram(s) Oral daily  sodium chloride 0.9%. 1000 milliLiter(s) (50 mL/Hr) IV Continuous <Continuous>  sodium chloride 0.9%. 1000 milliLiter(s) (230 mL/Hr) IV Continuous <Continuous>    MEDICATIONS  (PRN):  acetaminophen     Tablet .. 650 milliGRAM(s) Oral every 6 hours PRN Mild Pain (1 - 3)  dextrose Oral Gel 15 Gram(s) Oral once PRN Blood Glucose LESS THAN 70 milliGRAM(s)/deciliter        RADIOLOGY & ADDITIONAL TESTS:

## 2024-04-04 ENCOUNTER — TRANSCRIPTION ENCOUNTER (OUTPATIENT)
Age: 79
End: 2024-04-04

## 2024-04-04 PROBLEM — E78.00 PURE HYPERCHOLESTEROLEMIA, UNSPECIFIED: Chronic | Status: ACTIVE | Noted: 2024-04-01

## 2024-04-04 PROBLEM — B02.9 ZOSTER WITHOUT COMPLICATIONS: Chronic | Status: ACTIVE | Noted: 2024-04-01

## 2024-04-04 PROBLEM — Z87.39 PERSONAL HISTORY OF OTHER DISEASES OF THE MUSCULOSKELETAL SYSTEM AND CONNECTIVE TISSUE: Chronic | Status: ACTIVE | Noted: 2024-04-01

## 2024-04-04 PROBLEM — E11.9 TYPE 2 DIABETES MELLITUS WITHOUT COMPLICATIONS: Chronic | Status: ACTIVE | Noted: 2024-04-01

## 2024-04-04 PROBLEM — C61 MALIGNANT NEOPLASM OF PROSTATE: Chronic | Status: ACTIVE | Noted: 2024-04-01

## 2024-04-04 PROBLEM — K58.9 IRRITABLE BOWEL SYNDROME, UNSPECIFIED: Chronic | Status: ACTIVE | Noted: 2024-04-01

## 2024-04-04 PROBLEM — K58.9 IRRITABLE BOWEL SYNDROME WITHOUT DIARRHEA: Chronic | Status: ACTIVE | Noted: 2024-04-01

## 2024-04-04 PROBLEM — A60.00 HERPESVIRAL INFECTION OF UROGENITAL SYSTEM, UNSPECIFIED: Chronic | Status: ACTIVE | Noted: 2024-04-01

## 2024-04-04 PROBLEM — G47.33 OBSTRUCTIVE SLEEP APNEA (ADULT) (PEDIATRIC): Chronic | Status: ACTIVE | Noted: 2024-04-01

## 2024-04-04 PROBLEM — M10.9 GOUT, UNSPECIFIED: Chronic | Status: ACTIVE | Noted: 2024-04-01

## 2024-04-04 PROBLEM — I10 ESSENTIAL (PRIMARY) HYPERTENSION: Chronic | Status: ACTIVE | Noted: 2024-04-01

## 2024-04-04 LAB
ALBUMIN SERPL ELPH-MCNC: 3.6 G/DL — SIGNIFICANT CHANGE UP (ref 3.3–5)
ALP SERPL-CCNC: 82 U/L — SIGNIFICANT CHANGE UP (ref 40–120)
ALT FLD-CCNC: 11 U/L — SIGNIFICANT CHANGE UP (ref 10–45)
ANION GAP SERPL CALC-SCNC: 9 MMOL/L — SIGNIFICANT CHANGE UP (ref 5–17)
AST SERPL-CCNC: 15 U/L — SIGNIFICANT CHANGE UP (ref 10–40)
BILIRUB SERPL-MCNC: 0.2 MG/DL — SIGNIFICANT CHANGE UP (ref 0.2–1.2)
BLD GP AB SCN SERPL QL: NEGATIVE — SIGNIFICANT CHANGE UP
BUN SERPL-MCNC: 46 MG/DL — HIGH (ref 7–23)
CALCIUM SERPL-MCNC: 8.4 MG/DL — SIGNIFICANT CHANGE UP (ref 8.4–10.5)
CHLORIDE SERPL-SCNC: 109 MMOL/L — HIGH (ref 96–108)
CO2 SERPL-SCNC: 24 MMOL/L — SIGNIFICANT CHANGE UP (ref 22–31)
CREAT SERPL-MCNC: 2.06 MG/DL — HIGH (ref 0.5–1.3)
EGFR: 33 ML/MIN/1.73M2 — LOW
GLUCOSE BLDC GLUCOMTR-MCNC: 103 MG/DL — HIGH (ref 70–99)
GLUCOSE BLDC GLUCOMTR-MCNC: 154 MG/DL — HIGH (ref 70–99)
GLUCOSE BLDC GLUCOMTR-MCNC: 159 MG/DL — HIGH (ref 70–99)
GLUCOSE BLDC GLUCOMTR-MCNC: 205 MG/DL — HIGH (ref 70–99)
GLUCOSE SERPL-MCNC: 90 MG/DL — SIGNIFICANT CHANGE UP (ref 70–99)
HCT VFR BLD CALC: 28.2 % — LOW (ref 39–50)
HGB BLD-MCNC: 9.2 G/DL — LOW (ref 13–17)
MAGNESIUM SERPL-MCNC: 1.8 MG/DL — SIGNIFICANT CHANGE UP (ref 1.6–2.6)
MCHC RBC-ENTMCNC: 30.4 PG — SIGNIFICANT CHANGE UP (ref 27–34)
MCHC RBC-ENTMCNC: 32.6 GM/DL — SIGNIFICANT CHANGE UP (ref 32–36)
MCV RBC AUTO: 93.1 FL — SIGNIFICANT CHANGE UP (ref 80–100)
NRBC # BLD: 0 /100 WBCS — SIGNIFICANT CHANGE UP (ref 0–0)
PLATELET # BLD AUTO: 135 K/UL — LOW (ref 150–400)
POTASSIUM SERPL-MCNC: 4.3 MMOL/L — SIGNIFICANT CHANGE UP (ref 3.5–5.3)
POTASSIUM SERPL-SCNC: 4.3 MMOL/L — SIGNIFICANT CHANGE UP (ref 3.5–5.3)
PROT SERPL-MCNC: 5.6 G/DL — LOW (ref 6–8.3)
RBC # BLD: 3.03 M/UL — LOW (ref 4.2–5.8)
RBC # FLD: 14.5 % — SIGNIFICANT CHANGE UP (ref 10.3–14.5)
RH IG SCN BLD-IMP: POSITIVE — SIGNIFICANT CHANGE UP
SODIUM SERPL-SCNC: 142 MMOL/L — SIGNIFICANT CHANGE UP (ref 135–145)
WBC # BLD: 4.36 K/UL — SIGNIFICANT CHANGE UP (ref 3.8–10.5)
WBC # FLD AUTO: 4.36 K/UL — SIGNIFICANT CHANGE UP (ref 3.8–10.5)

## 2024-04-04 PROCEDURE — 99239 HOSP IP/OBS DSCHRG MGMT >30: CPT

## 2024-04-04 PROCEDURE — 94010 BREATHING CAPACITY TEST: CPT | Mod: 26

## 2024-04-04 RX ORDER — MAGNESIUM OXIDE 400 MG ORAL TABLET 241.3 MG
800 TABLET ORAL ONCE
Refills: 0 | Status: COMPLETED | OUTPATIENT
Start: 2024-04-04 | End: 2024-04-04

## 2024-04-04 RX ORDER — CHLORHEXIDINE GLUCONATE 213 G/1000ML
1 SOLUTION TOPICAL ONCE
Refills: 0 | Status: COMPLETED | OUTPATIENT
Start: 2024-04-04 | End: 2024-04-04

## 2024-04-04 RX ORDER — ACETAMINOPHEN 500 MG
1000 TABLET ORAL ONCE
Refills: 0 | Status: COMPLETED | OUTPATIENT
Start: 2024-04-04 | End: 2024-04-05

## 2024-04-04 RX ORDER — GABAPENTIN 400 MG/1
100 CAPSULE ORAL ONCE
Refills: 0 | Status: COMPLETED | OUTPATIENT
Start: 2024-04-04 | End: 2024-04-05

## 2024-04-04 RX ORDER — ASCORBIC ACID 60 MG
2000 TABLET,CHEWABLE ORAL ONCE
Refills: 0 | Status: COMPLETED | OUTPATIENT
Start: 2024-04-04 | End: 2024-04-04

## 2024-04-04 RX ORDER — CHLORHEXIDINE GLUCONATE 213 G/1000ML
15 SOLUTION TOPICAL ONCE
Refills: 0 | Status: COMPLETED | OUTPATIENT
Start: 2024-04-05 | End: 2024-04-05

## 2024-04-04 RX ORDER — CHLORHEXIDINE GLUCONATE 213 G/1000ML
1 SOLUTION TOPICAL ONCE
Refills: 0 | Status: COMPLETED | OUTPATIENT
Start: 2024-04-05 | End: 2024-04-05

## 2024-04-04 RX ORDER — MUPIROCIN 20 MG/G
1 OINTMENT TOPICAL
Refills: 0 | Status: DISCONTINUED | OUTPATIENT
Start: 2024-04-04 | End: 2024-04-05

## 2024-04-04 RX ADMIN — ESCITALOPRAM OXALATE 20 MILLIGRAM(S): 10 TABLET, FILM COATED ORAL at 22:28

## 2024-04-04 RX ADMIN — HEPARIN SODIUM 5000 UNIT(S): 5000 INJECTION INTRAVENOUS; SUBCUTANEOUS at 14:05

## 2024-04-04 RX ADMIN — CHLORHEXIDINE GLUCONATE 1 APPLICATION(S): 213 SOLUTION TOPICAL at 21:44

## 2024-04-04 RX ADMIN — Medication 2000 MILLIGRAM(S): at 22:30

## 2024-04-04 RX ADMIN — PANTOPRAZOLE SODIUM 40 MILLIGRAM(S): 20 TABLET, DELAYED RELEASE ORAL at 05:48

## 2024-04-04 RX ADMIN — Medication 1: at 17:12

## 2024-04-04 RX ADMIN — POLYETHYLENE GLYCOL 3350 17 GRAM(S): 17 POWDER, FOR SOLUTION ORAL at 12:17

## 2024-04-04 RX ADMIN — Medication 2: at 11:59

## 2024-04-04 RX ADMIN — HEPARIN SODIUM 5000 UNIT(S): 5000 INJECTION INTRAVENOUS; SUBCUTANEOUS at 22:27

## 2024-04-04 RX ADMIN — Medication 1 SPRAY(S): at 05:48

## 2024-04-04 RX ADMIN — BUPROPION HYDROCHLORIDE 150 MILLIGRAM(S): 150 TABLET, EXTENDED RELEASE ORAL at 12:16

## 2024-04-04 RX ADMIN — HEPARIN SODIUM 5000 UNIT(S): 5000 INJECTION INTRAVENOUS; SUBCUTANEOUS at 05:48

## 2024-04-04 RX ADMIN — MUPIROCIN 1 APPLICATION(S): 20 OINTMENT TOPICAL at 19:29

## 2024-04-04 RX ADMIN — Medication 12.5 MILLIGRAM(S): at 17:26

## 2024-04-04 RX ADMIN — MAGNESIUM OXIDE 400 MG ORAL TABLET 800 MILLIGRAM(S): 241.3 TABLET ORAL at 07:45

## 2024-04-04 RX ADMIN — Medication 300 MILLIGRAM(S): at 12:16

## 2024-04-04 RX ADMIN — Medication 12.5 MILLIGRAM(S): at 01:17

## 2024-04-04 RX ADMIN — Medication 81 MILLIGRAM(S): at 12:17

## 2024-04-04 RX ADMIN — Medication 12.5 MILLIGRAM(S): at 09:13

## 2024-04-04 RX ADMIN — Medication 1 SPRAY(S): at 17:26

## 2024-04-04 RX ADMIN — ATORVASTATIN CALCIUM 40 MILLIGRAM(S): 80 TABLET, FILM COATED ORAL at 22:27

## 2024-04-04 NOTE — PROGRESS NOTE ADULT - SUBJECTIVE AND OBJECTIVE BOX
Patient discussed on morning rounds with Dr. Lopez    Pre surgical workup     SUBJECTIVE ASSESSMENT:  78y Male seen and assessed at bedside this morning. Patient offers no acute complaints this morning.         Vital Signs Last 24 Hrs  T(C): 37 (04 Apr 2024 08:50), Max: 37 (04 Apr 2024 08:50)  T(F): 98.6 (04 Apr 2024 08:50), Max: 98.6 (04 Apr 2024 08:50)  HR: 70 (04 Apr 2024 08:50) (58 - 72)  BP: 128/60 (04 Apr 2024 08:50) (103/56 - 128/60)  BP(mean): 90 (04 Apr 2024 05:45) (74 - 95)  RR: 18 (04 Apr 2024 09:45) (17 - 22)  SpO2: 95% (04 Apr 2024 09:45) (94% - 100%)    Parameters below as of 04 Apr 2024 09:45  Patient On (Oxygen Delivery Method): room air      I&O's Detail    03 Apr 2024 07:01  -  04 Apr 2024 07:00  --------------------------------------------------------  IN:    Oral Fluid: 420 mL    sodium chloride 0.9%: 450 mL  Total IN: 870 mL    OUT:    Voided (mL): 2775 mL  Total OUT: 2775 mL    Total NET: -1905 mL      04 Apr 2024 07:01  -  04 Apr 2024 12:55  --------------------------------------------------------  IN:  Total IN: 0 mL    OUT:    Oral Fluid: 0 mL    Voided (mL): 625 mL  Total OUT: 625 mL    Total NET: -625 mL      PHYSICAL EXAM:  General: NAD  Neurological: AOx3. Motor skills grossly intact  Cardiovascular: Normal S1/S2. Regular rate/rhythm. AS murmur noted   Respiratory: Lungs CTA bilaterally. No wheezing or rales  Gastrointestinal: +BS in all 4 quadrants. Non-distended. Soft. Non-tender  Extremities: Strength 5/5 b/l upper/lower extremities. Sensation grossly intact upper/lower extremities. No edema. No calf tenderness.  Vascular: Radial 2+bilaterally, DP 2+ b/l  Skin: no rashes noted     LABS:                        9.2    4.36  )-----------( 135      ( 04 Apr 2024 05:30 )             28.2         04-04    142  |  109<H>  |  46<H>  ----------------------------<  90  4.3   |  24  |  2.06<H>    Ca    8.4      04 Apr 2024 05:30  Mg     1.8     04-04    TPro  5.6<L>  /  Alb  3.6  /  TBili  0.2  /  DBili  x   /  AST  15  /  ALT  11  /  AlkPhos  82  04-04      Urinalysis Basic - ( 04 Apr 2024 05:30 )    Color: x / Appearance: x / SG: x / pH: x  Gluc: 90 mg/dL / Ketone: x  / Bili: x / Urobili: x   Blood: x / Protein: x / Nitrite: x   Leuk Esterase: x / RBC: x / WBC x   Sq Epi: x / Non Sq Epi: x / Bacteria: x        MEDICATIONS  (STANDING):  allopurinol 300 milliGRAM(s) Oral daily  aspirin enteric coated 81 milliGRAM(s) Oral daily  atorvastatin 40 milliGRAM(s) Oral at bedtime  buPROPion XL (24-Hour) . 150 milliGRAM(s) Oral daily  dextrose 5%. 1000 milliLiter(s) (100 mL/Hr) IV Continuous <Continuous>  dextrose 5%. 1000 milliLiter(s) (50 mL/Hr) IV Continuous <Continuous>  dextrose 50% Injectable 12.5 Gram(s) IV Push once  dextrose 50% Injectable 25 Gram(s) IV Push once  dextrose 50% Injectable 25 Gram(s) IV Push once  escitalopram 20 milliGRAM(s) Oral daily  fluticasone propionate 50 MICROgram(s)/spray Nasal Spray 1 Spray(s) Both Nostrils two times a day  glucagon  Injectable 1 milliGRAM(s) IntraMuscular once  heparin   Injectable 5000 Unit(s) SubCutaneous every 8 hours  insulin lispro (ADMELOG) corrective regimen sliding scale   SubCutaneous at bedtime  insulin lispro (ADMELOG) corrective regimen sliding scale   SubCutaneous three times a day before meals  metoprolol tartrate 12.5 milliGRAM(s) Oral every 8 hours  pantoprazole    Tablet 40 milliGRAM(s) Oral before breakfast  polyethylene glycol 3350 17 Gram(s) Oral daily  sodium chloride 0.9%. 1000 milliLiter(s) (50 mL/Hr) IV Continuous <Continuous>  sodium chloride 0.9%. 1000 milliLiter(s) (230 mL/Hr) IV Continuous <Continuous>    MEDICATIONS  (PRN):  acetaminophen     Tablet .. 650 milliGRAM(s) Oral every 6 hours PRN Mild Pain (1 - 3)  dextrose Oral Gel 15 Gram(s) Oral once PRN Blood Glucose LESS THAN 70 milliGRAM(s)/deciliter        RADIOLOGY & ADDITIONAL TESTS:

## 2024-04-04 NOTE — PROGRESS NOTE ADULT - ASSESSMENT
78 y.o M with PMHX HTN, DM-II, HLD, CKD (baseline Cr 1.7-2.0), prostate cancer, who initially presented to ED with chest pain and SOB that started after abnormal outpatient exercise stress test  04/01/24 with residual symptoms at home after procedure. He was admitted to cardiology and on 4/2 had cardiac cath that revealed pLAD 60-70%, D1 mild diffuse disease L-L collaterals to LAD, mLAD  100%, prox-distal LCx 90%, OM1 mild diffuse disease, RCA mild diffuse disease. Patient admitted to CT surgery service for CAD and severe AS, surgical plan pending. TAVR scans completed.     P:  Neurovascular: No delirium. Pain well controlled with current regimen.  -PRN's: Tylenol  - c/w home meds: welbutrin and lexapro    Cardiovascular: Hemodynamically stable. HR controlled.  - surgical plan pending  - ECHO: severe AS --> pending TAVR completed  - CAD: ASA/statin  - pre op CABG: continue metoprolol 12.5 q12    Respiratory: 02 Sat = 98% on RA.  -If on oxygen wean to RA from for O2 Sat > 93%.  -Encourage C+DB and Use of IS 10x / hr while awake.  -CXR: no ptx or effusion     GI: Stable.  - hx IBS-C  -PPX: protonix  - bowel reg: miralax   -PO Diet.    Renal / : HX CKD   -Monitor renal function (BUN/Cr: 46/2.06)  -Monitor I/O's.  - Hx Gout: c/w home allopurinol    Endocrine: hx DM, on ISS  -A1c: 5.3  -TSH: 2.370    Hematologic:  -CBC: 9.2/28.2  -Coagulation Panel: PT/INR - ( 01 Apr 2024 19:10 )   PT: 10.9 sec;   INR: 0.95     PTT - ( 01 Apr 2024 19:10 )  PTT:33.2 sec    ID:  -afebrile  -WBC: 4.36    Prophylaxis:  -DVT prophylaxis with 5000 SubQ Heparin q8h.  -SCD's    Disposition:  - potential OR, surgical plan pending

## 2024-04-05 ENCOUNTER — APPOINTMENT (OUTPATIENT)
Dept: CARDIOTHORACIC SURGERY | Facility: HOSPITAL | Age: 79
End: 2024-04-05

## 2024-04-05 LAB
ALBUMIN SERPL ELPH-MCNC: 3 G/DL — LOW (ref 3.3–5)
ALBUMIN SERPL ELPH-MCNC: 3.3 G/DL — SIGNIFICANT CHANGE UP (ref 3.3–5)
ALBUMIN SERPL ELPH-MCNC: 3.4 G/DL — SIGNIFICANT CHANGE UP (ref 3.3–5)
ALP SERPL-CCNC: 65 U/L — SIGNIFICANT CHANGE UP (ref 40–120)
ALP SERPL-CCNC: 74 U/L — SIGNIFICANT CHANGE UP (ref 40–120)
ALP SERPL-CCNC: 86 U/L — SIGNIFICANT CHANGE UP (ref 40–120)
ALT FLD-CCNC: 19 U/L — SIGNIFICANT CHANGE UP (ref 10–45)
ALT FLD-CCNC: 23 U/L — SIGNIFICANT CHANGE UP (ref 10–45)
ALT FLD-CCNC: 25 U/L — SIGNIFICANT CHANGE UP (ref 10–45)
ANION GAP SERPL CALC-SCNC: 11 MMOL/L — SIGNIFICANT CHANGE UP (ref 5–17)
ANION GAP SERPL CALC-SCNC: 6 MMOL/L — SIGNIFICANT CHANGE UP (ref 5–17)
ANION GAP SERPL CALC-SCNC: 8 MMOL/L — SIGNIFICANT CHANGE UP (ref 5–17)
APTT BLD: 34.5 SEC — SIGNIFICANT CHANGE UP (ref 24.5–35.6)
APTT BLD: 54.6 SEC — HIGH (ref 24.5–35.6)
APTT BLD: 55.6 SEC — HIGH (ref 24.5–35.6)
AST SERPL-CCNC: 27 U/L — SIGNIFICANT CHANGE UP (ref 10–40)
AST SERPL-CCNC: 31 U/L — SIGNIFICANT CHANGE UP (ref 10–40)
AST SERPL-CCNC: 32 U/L — SIGNIFICANT CHANGE UP (ref 10–40)
BASE EXCESS BLDA CALC-SCNC: -1 MMOL/L — SIGNIFICANT CHANGE UP (ref -2–3)
BASE EXCESS BLDA CALC-SCNC: 0.3 MMOL/L — SIGNIFICANT CHANGE UP (ref -2–3)
BASE EXCESS BLDA CALC-SCNC: 0.5 MMOL/L — SIGNIFICANT CHANGE UP (ref -2–3)
BASE EXCESS BLDA CALC-SCNC: 0.7 MMOL/L — SIGNIFICANT CHANGE UP (ref -2–3)
BASE EXCESS BLDA CALC-SCNC: 0.9 MMOL/L — SIGNIFICANT CHANGE UP (ref -2–3)
BASE EXCESS BLDA CALC-SCNC: 2.8 MMOL/L — SIGNIFICANT CHANGE UP (ref -2–3)
BASOPHILS # BLD AUTO: 0.04 K/UL — SIGNIFICANT CHANGE UP (ref 0–0.2)
BASOPHILS # BLD AUTO: 0.04 K/UL — SIGNIFICANT CHANGE UP (ref 0–0.2)
BASOPHILS NFR BLD AUTO: 0.3 % — SIGNIFICANT CHANGE UP (ref 0–2)
BASOPHILS NFR BLD AUTO: 0.3 % — SIGNIFICANT CHANGE UP (ref 0–2)
BILIRUB SERPL-MCNC: 0.2 MG/DL — SIGNIFICANT CHANGE UP (ref 0.2–1.2)
BILIRUB SERPL-MCNC: 0.3 MG/DL — SIGNIFICANT CHANGE UP (ref 0.2–1.2)
BILIRUB SERPL-MCNC: 0.3 MG/DL — SIGNIFICANT CHANGE UP (ref 0.2–1.2)
BUN SERPL-MCNC: 37 MG/DL — HIGH (ref 7–23)
BUN SERPL-MCNC: 38 MG/DL — HIGH (ref 7–23)
BUN SERPL-MCNC: 44 MG/DL — HIGH (ref 7–23)
CA-I BLDA-SCNC: 1.17 MMOL/L — SIGNIFICANT CHANGE UP (ref 1.15–1.33)
CA-I BLDA-SCNC: 1.18 MMOL/L — SIGNIFICANT CHANGE UP (ref 1.15–1.33)
CA-I BLDA-SCNC: 1.2 MMOL/L — SIGNIFICANT CHANGE UP (ref 1.15–1.33)
CA-I BLDA-SCNC: 1.2 MMOL/L — SIGNIFICANT CHANGE UP (ref 1.15–1.33)
CA-I BLDA-SCNC: 1.21 MMOL/L — SIGNIFICANT CHANGE UP (ref 1.15–1.33)
CA-I BLDA-SCNC: 1.23 MMOL/L — SIGNIFICANT CHANGE UP (ref 1.15–1.33)
CALCIUM SERPL-MCNC: 8.9 MG/DL — SIGNIFICANT CHANGE UP (ref 8.4–10.5)
CALCIUM SERPL-MCNC: 9 MG/DL — SIGNIFICANT CHANGE UP (ref 8.4–10.5)
CALCIUM SERPL-MCNC: 9.5 MG/DL — SIGNIFICANT CHANGE UP (ref 8.4–10.5)
CHLORIDE SERPL-SCNC: 107 MMOL/L — SIGNIFICANT CHANGE UP (ref 96–108)
CHLORIDE SERPL-SCNC: 109 MMOL/L — HIGH (ref 96–108)
CHLORIDE SERPL-SCNC: 110 MMOL/L — HIGH (ref 96–108)
CO2 BLDA-SCNC: 25 MMOL/L — HIGH (ref 19–24)
CO2 BLDA-SCNC: 26 MMOL/L — HIGH (ref 19–24)
CO2 BLDA-SCNC: 26 MMOL/L — HIGH (ref 19–24)
CO2 BLDA-SCNC: 27 MMOL/L — HIGH (ref 19–24)
CO2 BLDA-SCNC: 27 MMOL/L — HIGH (ref 19–24)
CO2 BLDA-SCNC: 28 MMOL/L — HIGH (ref 19–24)
CO2 SERPL-SCNC: 20 MMOL/L — LOW (ref 22–31)
CO2 SERPL-SCNC: 24 MMOL/L — SIGNIFICANT CHANGE UP (ref 22–31)
CO2 SERPL-SCNC: 25 MMOL/L — SIGNIFICANT CHANGE UP (ref 22–31)
COHGB MFR BLDA: 0.1 % — SIGNIFICANT CHANGE UP
COHGB MFR BLDA: 0.2 % — SIGNIFICANT CHANGE UP
COHGB MFR BLDA: 0.5 % — SIGNIFICANT CHANGE UP
COHGB MFR BLDA: 0.5 % — SIGNIFICANT CHANGE UP
COHGB MFR BLDA: 0.8 % — SIGNIFICANT CHANGE UP
COHGB MFR BLDA: 1 % — SIGNIFICANT CHANGE UP
CREAT SERPL-MCNC: 1.69 MG/DL — HIGH (ref 0.5–1.3)
CREAT SERPL-MCNC: 1.76 MG/DL — HIGH (ref 0.5–1.3)
CREAT SERPL-MCNC: 2.1 MG/DL — HIGH (ref 0.5–1.3)
EGFR: 32 ML/MIN/1.73M2 — LOW
EGFR: 40 ML/MIN/1.73M2 — LOW
EGFR: 42 ML/MIN/1.73M2 — LOW
EOSINOPHIL # BLD AUTO: 0.01 K/UL — SIGNIFICANT CHANGE UP (ref 0–0.5)
EOSINOPHIL # BLD AUTO: 0.1 K/UL — SIGNIFICANT CHANGE UP (ref 0–0.5)
EOSINOPHIL NFR BLD AUTO: 0.1 % — SIGNIFICANT CHANGE UP (ref 0–6)
EOSINOPHIL NFR BLD AUTO: 0.9 % — SIGNIFICANT CHANGE UP (ref 0–6)
GAS PNL BLDA: SIGNIFICANT CHANGE UP
GLUCOSE BLDA-MCNC: 105 MG/DL — HIGH (ref 70–99)
GLUCOSE BLDA-MCNC: 118 MG/DL — HIGH (ref 70–99)
GLUCOSE BLDA-MCNC: 119 MG/DL — HIGH (ref 70–99)
GLUCOSE BLDA-MCNC: 120 MG/DL — HIGH (ref 70–99)
GLUCOSE BLDA-MCNC: 122 MG/DL — HIGH (ref 70–99)
GLUCOSE BLDA-MCNC: 130 MG/DL — HIGH (ref 70–99)
GLUCOSE BLDC GLUCOMTR-MCNC: 111 MG/DL — HIGH (ref 70–99)
GLUCOSE BLDC GLUCOMTR-MCNC: 135 MG/DL — HIGH (ref 70–99)
GLUCOSE BLDC GLUCOMTR-MCNC: 188 MG/DL — HIGH (ref 70–99)
GLUCOSE BLDC GLUCOMTR-MCNC: 98 MG/DL — SIGNIFICANT CHANGE UP (ref 70–99)
GLUCOSE SERPL-MCNC: 102 MG/DL — HIGH (ref 70–99)
GLUCOSE SERPL-MCNC: 137 MG/DL — HIGH (ref 70–99)
GLUCOSE SERPL-MCNC: 197 MG/DL — HIGH (ref 70–99)
HCO3 BLDA-SCNC: 24 MMOL/L — SIGNIFICANT CHANGE UP (ref 21–28)
HCO3 BLDA-SCNC: 24 MMOL/L — SIGNIFICANT CHANGE UP (ref 21–28)
HCO3 BLDA-SCNC: 25 MMOL/L — SIGNIFICANT CHANGE UP (ref 21–28)
HCO3 BLDA-SCNC: 26 MMOL/L — SIGNIFICANT CHANGE UP (ref 21–28)
HCO3 BLDA-SCNC: 26 MMOL/L — SIGNIFICANT CHANGE UP (ref 21–28)
HCO3 BLDA-SCNC: 27 MMOL/L — SIGNIFICANT CHANGE UP (ref 21–28)
HCT VFR BLD CALC: 26.4 % — LOW (ref 39–50)
HCT VFR BLD CALC: 28 % — LOW (ref 39–50)
HCT VFR BLD CALC: 30.6 % — LOW (ref 39–50)
HGB BLD-MCNC: 8.8 G/DL — LOW (ref 13–17)
HGB BLD-MCNC: 9.1 G/DL — LOW (ref 13–17)
HGB BLD-MCNC: 9.9 G/DL — LOW (ref 13–17)
HGB BLDA-MCNC: 8.4 G/DL — LOW (ref 12.6–17.4)
HGB BLDA-MCNC: 8.7 G/DL — LOW (ref 12.6–17.4)
HGB BLDA-MCNC: 8.9 G/DL — LOW (ref 12.6–17.4)
HGB BLDA-MCNC: 9.2 G/DL — LOW (ref 12.6–17.4)
HGB BLDA-MCNC: 9.2 G/DL — LOW (ref 12.6–17.4)
HGB BLDA-MCNC: 9.5 G/DL — LOW (ref 12.6–17.4)
IMM GRANULOCYTES NFR BLD AUTO: 0.5 % — SIGNIFICANT CHANGE UP (ref 0–0.9)
IMM GRANULOCYTES NFR BLD AUTO: 1.1 % — HIGH (ref 0–0.9)
INR BLD: 0.92 — SIGNIFICANT CHANGE UP (ref 0.85–1.18)
INR BLD: 1 — SIGNIFICANT CHANGE UP (ref 0.85–1.18)
INR BLD: 1.08 — SIGNIFICANT CHANGE UP (ref 0.85–1.18)
ISTAT ARTERIAL BE: -1 MMOL/L — SIGNIFICANT CHANGE UP (ref -2–3)
ISTAT ARTERIAL GLUCOSE: 124 MG/DL — HIGH (ref 70–99)
ISTAT ARTERIAL HCO3: 24 MMOL/L — SIGNIFICANT CHANGE UP (ref 22–26)
ISTAT ARTERIAL HEMATOCRIT: 31 % — LOW (ref 39–50)
ISTAT ARTERIAL HEMOGLOBIN: 10.5 G/DL — LOW (ref 13–17)
ISTAT ARTERIAL IONIZED CALCIUM: 1.29 MMOL/L — SIGNIFICANT CHANGE UP (ref 1.12–1.3)
ISTAT ARTERIAL PCO2: 39 MMHG — SIGNIFICANT CHANGE UP (ref 35–45)
ISTAT ARTERIAL PH: 7.39 — SIGNIFICANT CHANGE UP (ref 7.35–7.45)
ISTAT ARTERIAL PO2: 320 MMHG — HIGH (ref 80–105)
ISTAT ARTERIAL POTASSIUM: 4.6 MMOL/L — SIGNIFICANT CHANGE UP (ref 3.5–5.3)
ISTAT ARTERIAL SO2: 100 % — HIGH (ref 95–98)
ISTAT ARTERIAL SODIUM: 141 MMOL/L — SIGNIFICANT CHANGE UP (ref 135–145)
ISTAT ARTERIAL TCO2: 25 MMOL/L — SIGNIFICANT CHANGE UP (ref 22–31)
LACTATE SERPL-SCNC: 1.4 MMOL/L — SIGNIFICANT CHANGE UP (ref 0.5–2)
LYMPHOCYTES # BLD AUTO: 0.83 K/UL — LOW (ref 1–3.3)
LYMPHOCYTES # BLD AUTO: 0.96 K/UL — LOW (ref 1–3.3)
LYMPHOCYTES # BLD AUTO: 6.5 % — LOW (ref 13–44)
LYMPHOCYTES # BLD AUTO: 8.4 % — LOW (ref 13–44)
MAGNESIUM SERPL-MCNC: 1.8 MG/DL — SIGNIFICANT CHANGE UP (ref 1.6–2.6)
MAGNESIUM SERPL-MCNC: 2.1 MG/DL — SIGNIFICANT CHANGE UP (ref 1.6–2.6)
MAGNESIUM SERPL-MCNC: 2.4 MG/DL — SIGNIFICANT CHANGE UP (ref 1.6–2.6)
MCHC RBC-ENTMCNC: 30.6 PG — SIGNIFICANT CHANGE UP (ref 27–34)
MCHC RBC-ENTMCNC: 30.7 PG — SIGNIFICANT CHANGE UP (ref 27–34)
MCHC RBC-ENTMCNC: 30.9 PG — SIGNIFICANT CHANGE UP (ref 27–34)
MCHC RBC-ENTMCNC: 32.4 GM/DL — SIGNIFICANT CHANGE UP (ref 32–36)
MCHC RBC-ENTMCNC: 32.5 GM/DL — SIGNIFICANT CHANGE UP (ref 32–36)
MCHC RBC-ENTMCNC: 33.3 GM/DL — SIGNIFICANT CHANGE UP (ref 32–36)
MCV RBC AUTO: 92 FL — SIGNIFICANT CHANGE UP (ref 80–100)
MCV RBC AUTO: 94.3 FL — SIGNIFICANT CHANGE UP (ref 80–100)
MCV RBC AUTO: 95.6 FL — SIGNIFICANT CHANGE UP (ref 80–100)
METHGB MFR BLDA: 0.8 % — SIGNIFICANT CHANGE UP
METHGB MFR BLDA: 0.9 % — SIGNIFICANT CHANGE UP
METHGB MFR BLDA: 1 % — SIGNIFICANT CHANGE UP
METHGB MFR BLDA: 1 % — SIGNIFICANT CHANGE UP
METHGB MFR BLDA: 1.1 % — SIGNIFICANT CHANGE UP
METHGB MFR BLDA: 1.2 % — SIGNIFICANT CHANGE UP
MONOCYTES # BLD AUTO: 0.84 K/UL — SIGNIFICANT CHANGE UP (ref 0–0.9)
MONOCYTES # BLD AUTO: 0.98 K/UL — HIGH (ref 0–0.9)
MONOCYTES NFR BLD AUTO: 7.3 % — SIGNIFICANT CHANGE UP (ref 2–14)
MONOCYTES NFR BLD AUTO: 7.7 % — SIGNIFICANT CHANGE UP (ref 2–14)
NEUTROPHILS # BLD AUTO: 10.84 K/UL — HIGH (ref 1.8–7.4)
NEUTROPHILS # BLD AUTO: 9.39 K/UL — HIGH (ref 1.8–7.4)
NEUTROPHILS NFR BLD AUTO: 82 % — HIGH (ref 43–77)
NEUTROPHILS NFR BLD AUTO: 84.9 % — HIGH (ref 43–77)
NRBC # BLD: 0 /100 WBCS — SIGNIFICANT CHANGE UP (ref 0–0)
OXYHGB MFR BLDA: 97.4 % — HIGH (ref 90–95)
OXYHGB MFR BLDA: 97.6 % — HIGH (ref 90–95)
OXYHGB MFR BLDA: 97.7 % — HIGH (ref 90–95)
OXYHGB MFR BLDA: 97.9 % — HIGH (ref 90–95)
OXYHGB MFR BLDA: 98 % — HIGH (ref 90–95)
OXYHGB MFR BLDA: 98 % — HIGH (ref 90–95)
PCO2 BLDA: 35 MMHG — SIGNIFICANT CHANGE UP (ref 35–48)
PCO2 BLDA: 37 MMHG — SIGNIFICANT CHANGE UP (ref 35–48)
PCO2 BLDA: 40 MMHG — SIGNIFICANT CHANGE UP (ref 35–48)
PCO2 BLDA: 42 MMHG — SIGNIFICANT CHANGE UP (ref 35–48)
PCO2 BLDA: 43 MMHG — SIGNIFICANT CHANGE UP (ref 35–48)
PCO2 BLDA: 44 MMHG — SIGNIFICANT CHANGE UP (ref 35–48)
PH BLDA: 7.37 — SIGNIFICANT CHANGE UP (ref 7.35–7.45)
PH BLDA: 7.38 — SIGNIFICANT CHANGE UP (ref 7.35–7.45)
PH BLDA: 7.39 — SIGNIFICANT CHANGE UP (ref 7.35–7.45)
PH BLDA: 7.43 — SIGNIFICANT CHANGE UP (ref 7.35–7.45)
PH BLDA: 7.44 — SIGNIFICANT CHANGE UP (ref 7.35–7.45)
PH BLDA: 7.45 — SIGNIFICANT CHANGE UP (ref 7.35–7.45)
PHOSPHATE SERPL-MCNC: 3.7 MG/DL — SIGNIFICANT CHANGE UP (ref 2.5–4.5)
PHOSPHATE SERPL-MCNC: 5.1 MG/DL — HIGH (ref 2.5–4.5)
PLATELET # BLD AUTO: 127 K/UL — LOW (ref 150–400)
PLATELET # BLD AUTO: 135 K/UL — LOW (ref 150–400)
PLATELET # BLD AUTO: 170 K/UL — SIGNIFICANT CHANGE UP (ref 150–400)
PO2 BLDA: 371 MMHG — HIGH (ref 83–108)
PO2 BLDA: 382 MMHG — HIGH (ref 83–108)
PO2 BLDA: 396 MMHG — HIGH (ref 83–108)
PO2 BLDA: 399 MMHG — HIGH (ref 83–108)
PO2 BLDA: 426 MMHG — HIGH (ref 83–108)
PO2 BLDA: 438 MMHG — HIGH (ref 83–108)
POTASSIUM BLDA-SCNC: 5.1 MMOL/L — SIGNIFICANT CHANGE UP (ref 3.5–5.1)
POTASSIUM BLDA-SCNC: 5.1 MMOL/L — SIGNIFICANT CHANGE UP (ref 3.5–5.1)
POTASSIUM BLDA-SCNC: 5.3 MMOL/L — HIGH (ref 3.5–5.1)
POTASSIUM BLDA-SCNC: 5.4 MMOL/L — HIGH (ref 3.5–5.1)
POTASSIUM BLDA-SCNC: 5.4 MMOL/L — HIGH (ref 3.5–5.1)
POTASSIUM BLDA-SCNC: 5.5 MMOL/L — HIGH (ref 3.5–5.1)
POTASSIUM SERPL-MCNC: 4.7 MMOL/L — SIGNIFICANT CHANGE UP (ref 3.5–5.3)
POTASSIUM SERPL-MCNC: 4.9 MMOL/L — SIGNIFICANT CHANGE UP (ref 3.5–5.3)
POTASSIUM SERPL-MCNC: 6.3 MMOL/L — CRITICAL HIGH (ref 3.5–5.3)
POTASSIUM SERPL-SCNC: 4.7 MMOL/L — SIGNIFICANT CHANGE UP (ref 3.5–5.3)
POTASSIUM SERPL-SCNC: 4.9 MMOL/L — SIGNIFICANT CHANGE UP (ref 3.5–5.3)
POTASSIUM SERPL-SCNC: 6.3 MMOL/L — CRITICAL HIGH (ref 3.5–5.3)
PROT SERPL-MCNC: 4.5 G/DL — LOW (ref 6–8.3)
PROT SERPL-MCNC: 5.3 G/DL — LOW (ref 6–8.3)
PROT SERPL-MCNC: 5.6 G/DL — LOW (ref 6–8.3)
PROTHROM AB SERPL-ACNC: 10.5 SEC — SIGNIFICANT CHANGE UP (ref 9.5–13)
PROTHROM AB SERPL-ACNC: 11.4 SEC — SIGNIFICANT CHANGE UP (ref 9.5–13)
PROTHROM AB SERPL-ACNC: 12.3 SEC — SIGNIFICANT CHANGE UP (ref 9.5–13)
RBC # BLD: 2.87 M/UL — LOW (ref 4.2–5.8)
RBC # BLD: 2.97 M/UL — LOW (ref 4.2–5.8)
RBC # BLD: 3.2 M/UL — LOW (ref 4.2–5.8)
RBC # FLD: 14.5 % — SIGNIFICANT CHANGE UP (ref 10.3–14.5)
RBC # FLD: 14.6 % — HIGH (ref 10.3–14.5)
RBC # FLD: 14.7 % — HIGH (ref 10.3–14.5)
SAO2 % BLDA: 98.7 % — HIGH (ref 94–98)
SAO2 % BLDA: 98.9 % — HIGH (ref 94–98)
SAO2 % BLDA: 99.2 % — HIGH (ref 94–98)
SAO2 % BLDA: 99.3 % — HIGH (ref 94–98)
SAO2 % BLDA: 99.7 % — HIGH (ref 94–98)
SAO2 % BLDA: 99.8 % — HIGH (ref 94–98)
SODIUM BLDA-SCNC: 137 MMOL/L — SIGNIFICANT CHANGE UP (ref 136–145)
SODIUM BLDA-SCNC: 138 MMOL/L — SIGNIFICANT CHANGE UP (ref 136–145)
SODIUM BLDA-SCNC: 139 MMOL/L — SIGNIFICANT CHANGE UP (ref 136–145)
SODIUM BLDA-SCNC: 140 MMOL/L — SIGNIFICANT CHANGE UP (ref 136–145)
SODIUM SERPL-SCNC: 138 MMOL/L — SIGNIFICANT CHANGE UP (ref 135–145)
SODIUM SERPL-SCNC: 141 MMOL/L — SIGNIFICANT CHANGE UP (ref 135–145)
SODIUM SERPL-SCNC: 141 MMOL/L — SIGNIFICANT CHANGE UP (ref 135–145)
VANCOMYCIN TROUGH SERPL-MCNC: 12.2 UG/ML — SIGNIFICANT CHANGE UP (ref 10–20)
WBC # BLD: 11.46 K/UL — HIGH (ref 3.8–10.5)
WBC # BLD: 12.77 K/UL — HIGH (ref 3.8–10.5)
WBC # BLD: 4.99 K/UL — SIGNIFICANT CHANGE UP (ref 3.8–10.5)
WBC # FLD AUTO: 11.46 K/UL — HIGH (ref 3.8–10.5)
WBC # FLD AUTO: 12.77 K/UL — HIGH (ref 3.8–10.5)
WBC # FLD AUTO: 4.99 K/UL — SIGNIFICANT CHANGE UP (ref 3.8–10.5)

## 2024-04-05 PROCEDURE — 99291 CRITICAL CARE FIRST HOUR: CPT

## 2024-04-05 PROCEDURE — 33533 CABG ARTERIAL SINGLE: CPT | Mod: AS

## 2024-04-05 PROCEDURE — 33533 CABG ARTERIAL SINGLE: CPT

## 2024-04-05 PROCEDURE — 33518 CABG ARTERY-VEIN TWO: CPT

## 2024-04-05 PROCEDURE — 71045 X-RAY EXAM CHEST 1 VIEW: CPT | Mod: 26

## 2024-04-05 PROCEDURE — 99292 CRITICAL CARE ADDL 30 MIN: CPT

## 2024-04-05 PROCEDURE — 93010 ELECTROCARDIOGRAM REPORT: CPT

## 2024-04-05 PROCEDURE — 33518 CABG ARTERY-VEIN TWO: CPT | Mod: AS

## 2024-04-05 DEVICE — CANNULA VESSEL 3MM BEVELED TIP RADIOPAQUE: Type: IMPLANTABLE DEVICE | Status: FUNCTIONAL

## 2024-04-05 DEVICE — LIGATING CLIPS WECK HORIZON MEDIUM (BLUE) 24: Type: IMPLANTABLE DEVICE | Status: FUNCTIONAL

## 2024-04-05 DEVICE — SHUNT VESSEL TAPR TIP 2.0MM 12MM SHAFT BX/10: Type: IMPLANTABLE DEVICE | Status: FUNCTIONAL

## 2024-04-05 DEVICE — LIGATING CLIPS WECK HORIZON SMALL-WIDE (RED) 24: Type: IMPLANTABLE DEVICE | Status: FUNCTIONAL

## 2024-04-05 DEVICE — TACHOSIL 9.5 X 4.8CM: Type: IMPLANTABLE DEVICE | Status: FUNCTIONAL

## 2024-04-05 DEVICE — SHUNT FLO-THRU INTRALUMINAL1.75MM X 18MM: Type: IMPLANTABLE DEVICE | Status: FUNCTIONAL

## 2024-04-05 DEVICE — IMPLANTABLE DEVICE: Type: IMPLANTABLE DEVICE | Status: FUNCTIONAL

## 2024-04-05 DEVICE — LIGATING CLIPS WECK HORIZON SMALL (YELLOW) 24: Type: IMPLANTABLE DEVICE | Status: FUNCTIONAL

## 2024-04-05 DEVICE — CHEST DRAIN THORACIC PVC 28FR RIGHT ANGLE: Type: IMPLANTABLE DEVICE | Status: FUNCTIONAL

## 2024-04-05 DEVICE — SHUNT FLO-THRU INTRALUMINAL 2.25MM X 18MM: Type: IMPLANTABLE DEVICE | Status: FUNCTIONAL

## 2024-04-05 RX ORDER — ASCORBIC ACID 60 MG
500 TABLET,CHEWABLE ORAL DAILY
Refills: 0 | Status: DISCONTINUED | OUTPATIENT
Start: 2024-04-05 | End: 2024-04-08

## 2024-04-05 RX ORDER — SENNA PLUS 8.6 MG/1
2 TABLET ORAL AT BEDTIME
Refills: 0 | Status: DISCONTINUED | OUTPATIENT
Start: 2024-04-06 | End: 2024-04-09

## 2024-04-05 RX ORDER — VANCOMYCIN HCL 1 G
1000 VIAL (EA) INTRAVENOUS EVERY 24 HOURS
Refills: 0 | Status: COMPLETED | OUTPATIENT
Start: 2024-04-05 | End: 2024-04-08

## 2024-04-05 RX ORDER — FUROSEMIDE 40 MG
20 TABLET ORAL ONCE
Refills: 0 | Status: COMPLETED | OUTPATIENT
Start: 2024-04-05 | End: 2024-04-05

## 2024-04-05 RX ORDER — PANTOPRAZOLE SODIUM 20 MG/1
40 TABLET, DELAYED RELEASE ORAL
Refills: 0 | Status: DISCONTINUED | OUTPATIENT
Start: 2024-04-05 | End: 2024-04-09

## 2024-04-05 RX ORDER — CALCIUM GLUCONATE 100 MG/ML
2 VIAL (ML) INTRAVENOUS ONCE
Refills: 0 | Status: COMPLETED | OUTPATIENT
Start: 2024-04-05 | End: 2024-04-05

## 2024-04-05 RX ORDER — NOREPINEPHRINE BITARTRATE/D5W 8 MG/250ML
0.05 PLASTIC BAG, INJECTION (ML) INTRAVENOUS
Qty: 8 | Refills: 0 | Status: DISCONTINUED | OUTPATIENT
Start: 2024-04-05 | End: 2024-04-06

## 2024-04-05 RX ORDER — ATORVASTATIN CALCIUM 80 MG/1
40 TABLET, FILM COATED ORAL AT BEDTIME
Refills: 0 | Status: DISCONTINUED | OUTPATIENT
Start: 2024-04-05 | End: 2024-04-09

## 2024-04-05 RX ORDER — ACETAMINOPHEN 500 MG
1000 TABLET ORAL ONCE
Refills: 0 | Status: COMPLETED | OUTPATIENT
Start: 2024-04-05 | End: 2024-04-05

## 2024-04-05 RX ORDER — HYDROMORPHONE HYDROCHLORIDE 2 MG/ML
0.5 INJECTION INTRAMUSCULAR; INTRAVENOUS; SUBCUTANEOUS ONCE
Refills: 0 | Status: DISCONTINUED | OUTPATIENT
Start: 2024-04-05 | End: 2024-04-05

## 2024-04-05 RX ORDER — POLYETHYLENE GLYCOL 3350 17 G/17G
17 POWDER, FOR SOLUTION ORAL DAILY
Refills: 0 | Status: DISCONTINUED | OUTPATIENT
Start: 2024-04-06 | End: 2024-04-09

## 2024-04-05 RX ORDER — CLOPIDOGREL BISULFATE 75 MG/1
75 TABLET, FILM COATED ORAL DAILY
Refills: 0 | Status: DISCONTINUED | OUTPATIENT
Start: 2024-04-05 | End: 2024-04-09

## 2024-04-05 RX ORDER — INSULIN HUMAN 100 [IU]/ML
1 INJECTION, SOLUTION SUBCUTANEOUS
Qty: 50 | Refills: 0 | Status: DISCONTINUED | OUTPATIENT
Start: 2024-04-05 | End: 2024-04-06

## 2024-04-05 RX ORDER — ESCITALOPRAM OXALATE 10 MG/1
20 TABLET, FILM COATED ORAL DAILY
Refills: 0 | Status: DISCONTINUED | OUTPATIENT
Start: 2024-04-05 | End: 2024-04-09

## 2024-04-05 RX ORDER — ACETAMINOPHEN 500 MG
1000 TABLET ORAL ONCE
Refills: 0 | Status: DISCONTINUED | OUTPATIENT
Start: 2024-04-05 | End: 2024-04-05

## 2024-04-05 RX ORDER — CHLORHEXIDINE GLUCONATE 213 G/1000ML
1 SOLUTION TOPICAL DAILY
Refills: 0 | Status: DISCONTINUED | OUTPATIENT
Start: 2024-04-05 | End: 2024-04-08

## 2024-04-05 RX ORDER — MUPIROCIN 20 MG/G
1 OINTMENT TOPICAL
Refills: 0 | Status: DISCONTINUED | OUTPATIENT
Start: 2024-04-05 | End: 2024-04-09

## 2024-04-05 RX ORDER — DEXTROSE 50 % IN WATER 50 %
25 SYRINGE (ML) INTRAVENOUS
Refills: 0 | Status: DISCONTINUED | OUTPATIENT
Start: 2024-04-05 | End: 2024-04-06

## 2024-04-05 RX ORDER — ALLOPURINOL 300 MG
300 TABLET ORAL DAILY
Refills: 0 | Status: DISCONTINUED | OUTPATIENT
Start: 2024-04-05 | End: 2024-04-09

## 2024-04-05 RX ORDER — SODIUM CHLORIDE 9 MG/ML
1000 INJECTION INTRAMUSCULAR; INTRAVENOUS; SUBCUTANEOUS
Refills: 0 | Status: DISCONTINUED | OUTPATIENT
Start: 2024-04-05 | End: 2024-04-09

## 2024-04-05 RX ORDER — CHLORHEXIDINE GLUCONATE 213 G/1000ML
15 SOLUTION TOPICAL EVERY 12 HOURS
Refills: 0 | Status: DISCONTINUED | OUTPATIENT
Start: 2024-04-05 | End: 2024-04-08

## 2024-04-05 RX ORDER — IPRATROPIUM/ALBUTEROL SULFATE 18-103MCG
3 AEROSOL WITH ADAPTER (GRAM) INHALATION ONCE
Refills: 0 | Status: COMPLETED | OUTPATIENT
Start: 2024-04-05 | End: 2024-04-05

## 2024-04-05 RX ORDER — BUPROPION HYDROCHLORIDE 150 MG/1
150 TABLET, EXTENDED RELEASE ORAL DAILY
Refills: 0 | Status: DISCONTINUED | OUTPATIENT
Start: 2024-04-05 | End: 2024-04-09

## 2024-04-05 RX ORDER — ACETAMINOPHEN 500 MG
1000 TABLET ORAL EVERY 6 HOURS
Refills: 0 | Status: DISCONTINUED | OUTPATIENT
Start: 2024-04-05 | End: 2024-04-05

## 2024-04-05 RX ORDER — ASPIRIN/CALCIUM CARB/MAGNESIUM 324 MG
81 TABLET ORAL DAILY
Refills: 0 | Status: DISCONTINUED | OUTPATIENT
Start: 2024-04-05 | End: 2024-04-09

## 2024-04-05 RX ORDER — HEPARIN SODIUM 5000 [USP'U]/ML
5000 INJECTION INTRAVENOUS; SUBCUTANEOUS EVERY 8 HOURS
Refills: 0 | Status: DISCONTINUED | OUTPATIENT
Start: 2024-04-05 | End: 2024-04-08

## 2024-04-05 RX ORDER — MAGNESIUM SULFATE 500 MG/ML
2 VIAL (ML) INJECTION ONCE
Refills: 0 | Status: COMPLETED | OUTPATIENT
Start: 2024-04-05 | End: 2024-04-05

## 2024-04-05 RX ORDER — DEXTROSE 50 % IN WATER 50 %
50 SYRINGE (ML) INTRAVENOUS
Refills: 0 | Status: DISCONTINUED | OUTPATIENT
Start: 2024-04-05 | End: 2024-04-06

## 2024-04-05 RX ORDER — VASOPRESSIN 20 [USP'U]/ML
0.03 INJECTION INTRAVENOUS
Qty: 40 | Refills: 0 | Status: DISCONTINUED | OUTPATIENT
Start: 2024-04-05 | End: 2024-04-06

## 2024-04-05 RX ORDER — DEXMEDETOMIDINE HYDROCHLORIDE IN 0.9% SODIUM CHLORIDE 4 UG/ML
0.2 INJECTION INTRAVENOUS
Qty: 400 | Refills: 0 | Status: DISCONTINUED | OUTPATIENT
Start: 2024-04-05 | End: 2024-04-08

## 2024-04-05 RX ADMIN — GABAPENTIN 100 MILLIGRAM(S): 400 CAPSULE ORAL at 12:06

## 2024-04-05 RX ADMIN — MUPIROCIN 1 APPLICATION(S): 20 OINTMENT TOPICAL at 18:28

## 2024-04-05 RX ADMIN — Medication 25 GRAM(S): at 18:49

## 2024-04-05 RX ADMIN — VASOPRESSIN 4.5 UNIT(S)/MIN: 20 INJECTION INTRAVENOUS at 23:10

## 2024-04-05 RX ADMIN — VASOPRESSIN 4.5 UNIT(S)/MIN: 20 INJECTION INTRAVENOUS at 22:50

## 2024-04-05 RX ADMIN — CHLORHEXIDINE GLUCONATE 15 MILLILITER(S): 213 SOLUTION TOPICAL at 18:28

## 2024-04-05 RX ADMIN — HYDROMORPHONE HYDROCHLORIDE 0.5 MILLIGRAM(S): 2 INJECTION INTRAMUSCULAR; INTRAVENOUS; SUBCUTANEOUS at 21:00

## 2024-04-05 RX ADMIN — CHLORHEXIDINE GLUCONATE 1 APPLICATION(S): 213 SOLUTION TOPICAL at 05:32

## 2024-04-05 RX ADMIN — Medication 6.43 MICROGRAM(S)/KG/MIN: at 20:04

## 2024-04-05 RX ADMIN — Medication 200 GRAM(S): at 22:42

## 2024-04-05 RX ADMIN — Medication 20 MILLIGRAM(S): at 22:51

## 2024-04-05 RX ADMIN — Medication 3 MILLILITER(S): at 23:04

## 2024-04-05 RX ADMIN — Medication 1000 MILLIGRAM(S): at 19:12

## 2024-04-05 RX ADMIN — Medication 1000 MILLIGRAM(S): at 12:05

## 2024-04-05 RX ADMIN — DEXMEDETOMIDINE HYDROCHLORIDE IN 0.9% SODIUM CHLORIDE 3.43 MICROGRAM(S)/KG/HR: 4 INJECTION INTRAVENOUS at 20:01

## 2024-04-05 RX ADMIN — CHLORHEXIDINE GLUCONATE 15 MILLILITER(S): 213 SOLUTION TOPICAL at 05:32

## 2024-04-05 RX ADMIN — MUPIROCIN 1 APPLICATION(S): 20 OINTMENT TOPICAL at 07:42

## 2024-04-05 RX ADMIN — HYDROMORPHONE HYDROCHLORIDE 0.5 MILLIGRAM(S): 2 INJECTION INTRAMUSCULAR; INTRAVENOUS; SUBCUTANEOUS at 20:31

## 2024-04-05 RX ADMIN — CHLORHEXIDINE GLUCONATE 1 APPLICATION(S): 213 SOLUTION TOPICAL at 00:05

## 2024-04-05 RX ADMIN — Medication 1 SPRAY(S): at 05:28

## 2024-04-05 RX ADMIN — Medication 400 MILLIGRAM(S): at 18:57

## 2024-04-05 RX ADMIN — PANTOPRAZOLE SODIUM 40 MILLIGRAM(S): 20 TABLET, DELAYED RELEASE ORAL at 07:43

## 2024-04-05 RX ADMIN — HEPARIN SODIUM 5000 UNIT(S): 5000 INJECTION INTRAVENOUS; SUBCUTANEOUS at 05:33

## 2024-04-05 RX ADMIN — Medication 12.5 MILLIGRAM(S): at 05:33

## 2024-04-05 NOTE — PROGRESS NOTE ADULT - SUBJECTIVE AND OBJECTIVE BOX
CTICU  CRITICAL  CARE  attending     Hand off received 					   Pertinent clinical, laboratory, radiographic, hemodynamic, echocardiographic, respiratory data, microbiologic data and chart were reviewed and analyzed frequently throughout the course of the day  Patient seen and examined with CTS/ SH attending at bedside  Pt is a 78yr old male with PMH HTN, DM, HLD, prostate ca sp RT, CKD, presented to [facility] ER 4/1 for CP. Labs sig for troponemia for which pt was loaded with asa and plavix and admitted to telemetry. Cath revealed pLAD 60-70%, D1 mild diffuse disease L-L collaterals to LAD, mLAD  100%, prox-distal LCx 90%, OM1 mild diffuse disease, RCA mild diffuse disease, severe AS for CTS was consulted and pt deemed a surgical candidate. Pt underwent OpCABG x 3 (LIMA-LAD-Diag, SVG-OM, EF 50%) with Dr. Lopez 4/5. given 2500cc IVF with 550cc urine output. Arrived intubated on levophed 0.02.       FAMILY HISTORY:  FH: CAD (coronary artery disease) (Father, Sibling)  PAST MEDICAL & SURGICAL HISTORY:  Gout  Prostate cancer  IBS (irritable bowel syndrome)  Hypertension  Diabetes  Hypercholesterolemia  H/O arthritis  Genital herpes  Obstructive sleep apnea  Shingles  History of tonsillectomy  S/P tooth extraction  Injury of meniscus of left knee        Patient is a 78y old  Male who presents with a chief complaint of CHEST PAIN     (05 Apr 2024 10:42)      14 system review was unremarkable    Vital signs, hemodynamic and respiratory parameters were reviewed from the bedside nursing flowsheet.  ICU Vital Signs Last 24 Hrs  T(C): 36.5 (05 Apr 2024 12:36), Max: 36.7 (04 Apr 2024 20:39)  T(F): 97.7 (05 Apr 2024 12:15), Max: 98.1 (04 Apr 2024 20:39)  HR: 69 (05 Apr 2024 18:00) (53 - 69)  BP: 131/67 (05 Apr 2024 12:36) (114/64 - 133/69)  BP(mean): 82 (05 Apr 2024 12:36) (82 - 94)  ABP: 126/62 (05 Apr 2024 18:00) (126/62 - 137/67)  ABP(mean): 83 (05 Apr 2024 18:00) (83 - 94)  RR: 10 (05 Apr 2024 18:00) (10 - 18)  SpO2: 97% (05 Apr 2024 18:00) (95% - 100%)    O2 Parameters below as of 05 Apr 2024 18:45  Patient On (Oxygen Delivery Method): ventilator          Adult Advanced Hemodynamics Last 24 Hrs  CVP(mm Hg): 5 (05 Apr 2024 18:00) (3 - 5)  CVP(cm H2O): --  CO: --  CI: --  PA: --  PA(mean): --  PCWP: --  SVR: --  SVRI: --  PVR: --  PVRI: --, ABG - ( 05 Apr 2024 17:52 )  pH, Arterial: 7.41  pH, Blood: x     /  pCO2: 36    /  pO2: 264   / HCO3: 23    / Base Excess: -1.4  /  SaO2: 100.0               Intake and output was reviewed and the fluid balance was calculated  Daily Height in cm: 160.02 (05 Apr 2024 12:36)    Daily   I&O's Summary    04 Apr 2024 07:01  -  05 Apr 2024 07:00  --------------------------------------------------------  IN: 640 mL / OUT: 1975 mL / NET: -1335 mL    05 Apr 2024 07:01  -  05 Apr 2024 18:08  --------------------------------------------------------  IN: 0 mL / OUT: 45 mL / NET: -45 mL        All lines and drain sites were assessed  Glycemic trend was reviewedCAPILLARY BLOOD GLUCOSE      POCT Blood Glucose.: 135 mg/dL (05 Apr 2024 18:01)      Neuro: sedated  HEENT: ett  Heart: s1 s2  Lungs: clear bl  Abdomen: soft nt nd  Extremities: wwp    Lines:  RIJ TLC 4/5  L radial arterial line 4/5    Tubes:  Pleural x 2    labs  CBC Full  -  ( 05 Apr 2024 05:30 )  WBC Count : 4.99 K/uL  RBC Count : 3.20 M/uL  Hemoglobin : 9.9 g/dL  Hematocrit : 30.6 %  Platelet Count - Automated : 135 K/uL  Mean Cell Volume : 95.6 fl  Mean Cell Hemoglobin : 30.9 pg  Mean Cell Hemoglobin Concentration : 32.4 gm/dL  Auto Neutrophil # : x  Auto Lymphocyte # : x  Auto Monocyte # : x  Auto Eosinophil # : x  Auto Basophil # : x  Auto Neutrophil % : x  Auto Lymphocyte % : x  Auto Monocyte % : x  Auto Eosinophil % : x  Auto Basophil % : x    04-05    141  |  109<H>  |  44<H>  ----------------------------<  102<H>  4.7   |  24  |  2.10<H>    Ca    9.0      05 Apr 2024 05:30  Mg     2.1     04-05    TPro  5.6<L>  /  Alb  3.4  /  TBili  0.2  /  DBili  x   /  AST  31  /  ALT  23  /  AlkPhos  86  04-05    PT/INR - ( 05 Apr 2024 05:30 )   PT: 10.5 sec;   INR: 0.92          PTT - ( 05 Apr 2024 05:30 )  PTT:55.6 sec  The current medications were reviewed   MEDICATIONS  (STANDING):  acetaminophen     Tablet .. 1000 milliGRAM(s) Oral every 6 hours  allopurinol 300 milliGRAM(s) Oral daily  ascorbic acid 500 milliGRAM(s) Oral daily  aspirin enteric coated 81 milliGRAM(s) Oral daily  atorvastatin 40 milliGRAM(s) Oral at bedtime  buPROPion XL (24-Hour) . 150 milliGRAM(s) Oral daily  chlorhexidine 0.12% Liquid 15 milliLiter(s) Oral Mucosa every 12 hours  chlorhexidine 2% Cloths 1 Application(s) Topical daily  clopidogrel Tablet 75 milliGRAM(s) Oral daily  dextrose 50% Injectable 50 milliLiter(s) IV Push every 15 minutes  dextrose 50% Injectable 25 milliLiter(s) IV Push every 15 minutes  escitalopram 20 milliGRAM(s) Oral daily  heparin   Injectable 5000 Unit(s) SubCutaneous every 8 hours  insulin regular Infusion 1 Unit(s)/Hr (1 mL/Hr) IV Continuous <Continuous>  mupirocin 2% Nasal 1 Application(s) Both Nostrils two times a day  pantoprazole    Tablet 40 milliGRAM(s) Oral before breakfast  sodium chloride 0.9%. 1000 milliLiter(s) (50 mL/Hr) IV Continuous <Continuous>  sodium chloride 0.9%. 1000 milliLiter(s) (10 mL/Hr) IV Continuous <Continuous>  sodium chloride 0.9%. 1000 milliLiter(s) (230 mL/Hr) IV Continuous <Continuous>    MEDICATIONS  (PRN):      Assessment/Plan:  78yr old male with PMH HTN, DM, HLD, prostate ca sp RT, CKD, presented to [facility] ER 4/1 for CP. Labs sig for troponemia for which pt was loaded with asa and plavix and admitted to telemetry. Cath revealed pLAD 60-70%, D1 mild diffuse disease L-L collaterals to LAD, mLAD  100%, prox-distal LCx 90%, OM1 mild diffuse disease, RCA mild diffuse disease, severe AS for CTS was consulted and pt deemed a surgical candidate.     Sp OpCABG x 3 (LIMA-LAD-Diag, SVG-OM, EF 50%, Lopez, 4/5)  Acute post operative mechanical ventilation requirement-wean to extubate  Vasogenic shock on levophed-titrate to MAP 70  Acute post operative anemia due to acute blood loss-trend H/H  Thrombocytopenia-monitor  Aspirin  Plavix  Atorvastatin  Periop abx  Replete lytes prn  Monitor CT output  GI/DVT PPX  Bowel Regimen  Pain control  Close hemodynamic, ventilatory and drain monitoring and management per post op routine  Monitor for arrhythmias and monitor parameters for organ perfusion  Beta blockade not administered due to hemodynamic instability and bradycardia  Monitor neurologic status  Head of the bed should remain elevated to 45 deg   Chest PT and IS will be encouraged  Monitor adequacy of oxygenation and ventilation and attempt to wean oxygen  Antibiotic regimen will be tailored to the clinical, laboratory and microbiologic data  Nutritional goals will be met using po eventually, ensure adequate caloric intake and monitor the same  Stress ulcer and VTE prophylaxis will be achieved    Glycemic control is satisfactory  Electrolytes have been repleted as necessary and wound care has been carried out   Pain control has been achieved.   Aggressive physical therapy and early mobility and ambulation goals will be met   The family was updated about the course and plan.    CRITICAL CARE TIME personally provided by me  in evaluation and management, reassessments, review and interpretation of labs and x-rays, ventilator and hemodynamic management, formulating a plan and coordinating care: ___140____ MIN.  Time does not include procedural time.         CTICU ATTENDING     					  Sweetie Alvarado MD

## 2024-04-05 NOTE — DIETITIAN INITIAL EVALUATION ADULT - PERSON TAUGHT/METHOD
Pt/family provided diet education on various topics today. Understanding stated, provide additional motivation as needed./verbal instruction/teach back - (Patient repeats in own words)

## 2024-04-05 NOTE — DIETITIAN INITIAL EVALUATION ADULT - NS FNS DIET ORDER
Diet, NPO after Midnight:      NPO Start Date: 04-Apr-2024,   NPO Start Time: 23:59  Except Medications     Special Instructions for Nursing:  Except Medications (04-04-24 @ 14:23)

## 2024-04-05 NOTE — DIETITIAN INITIAL EVALUATION ADULT - ADD RECOMMEND
1. Monitor PO intake/appetite, GI distress, diet tolerance, labs, weights.  - Optimize bowel regime in setting of Pending OR and Hx of IBS-C.   2. Honor pt food preferences as able.  3. RD to remain available for additional nutrition interventions as needed.  ** Moderate Nutrition Risk.

## 2024-04-05 NOTE — DIETITIAN INITIAL EVALUATION ADULT - OTHER INFO
77 yo M, PMHX HTN, DMII, HLD, CKD (baseline Cr 1.7-2.0), prostate cancer, who initially presented to ED with chest pain and SOB that started after abnormal outpatient exercise stress test  4/1/24 with residual symptoms at home after procedure. He was admitted to cardiology and on 4/2 had cardiac cath that revealed pLAD 60-70%, D1 mild diffuse disease L-L collaterals to LAD, mLAD  100%, prox-distal LCx 90%, OM1 mild diffuse disease, RCA mild diffuse disease. Pt admitted to CT surgery service for CAD and severe AS, surgical plan pending. TAVR scans completed. Pending MIDCAB at this time.     Pt seen this AM on 5UR-pending transfer to CT sx. Family at bedside. Pt NPO This AM. Prior to NPO on a DASH diet, Reported good PO intake. PTA seems to be on a regular diet. NKFA. No issues chewing/swallowing. Reports hx of IBS-C. Takes MIRALAX, had taken lubiprostone prior. Currently is ordered for MIRALAX and Protonix. No pain. Matthieu 19. No Edema or pressure ulcer - SX site noted. BUN/Cr 44/2.10, POCT 111 154, 159, , Lipids WDL, A1c WDL.   Please see below for nutritions recommendations.

## 2024-04-05 NOTE — DIETITIAN INITIAL EVALUATION ADULT - PERTINENT LABORATORY DATA
04-05    141  |  109<H>  |  44<H>  ----------------------------<  102<H>  4.7   |  24  |  2.10<H>    Ca    9.0      05 Apr 2024 05:30  Mg     2.1     04-05    TPro  5.6<L>  /  Alb  3.4  /  TBili  0.2  /  DBili  x   /  AST  31  /  ALT  23  /  AlkPhos  86  04-05  POCT Blood Glucose.: 111 mg/dL (04-05-24 @ 07:26)  A1C with Estimated Average Glucose Result: 5.3 % (04-02-24 @ 09:09)

## 2024-04-05 NOTE — BRIEF OPERATIVE NOTE - OPERATION/FINDINGS
CAD, EF 60%  [facility] [place] Time:  [facility] Prep Time: OPCABx3 (LIMA-LAD-Diag, SVG-OM)  CAD, EF 50%  [facility] [place] Time: 45  [facility] Prep Time: 10

## 2024-04-05 NOTE — DIETITIAN INITIAL EVALUATION ADULT - OTHER CALCULATIONS
5'3''  pounds +-10%  Wt 151 pounds BMI 26.7 %NAP=7462  IBW used to calculate energy needs due to pt's current body weight exceeding 120% of IBW  Adjust for age, CKD, Pending OR - fluids per team

## 2024-04-05 NOTE — DIETITIAN INITIAL EVALUATION ADULT - DIET TYPE
Diet to be advanced in 24-48hrs as medically feasible. Resume Diet. Add oral nutrition supplements PRN Pending %PO intake.

## 2024-04-05 NOTE — DIETITIAN INITIAL EVALUATION ADULT - PERTINENT MEDS FT
MEDICATIONS  (STANDING):  acetaminophen     Tablet .. 1000 milliGRAM(s) Oral once  allopurinol 300 milliGRAM(s) Oral daily  aspirin enteric coated 81 milliGRAM(s) Oral daily  atorvastatin 40 milliGRAM(s) Oral at bedtime  buPROPion XL (24-Hour) . 150 milliGRAM(s) Oral daily  dextrose 5%. 1000 milliLiter(s) (50 mL/Hr) IV Continuous <Continuous>  dextrose 5%. 1000 milliLiter(s) (100 mL/Hr) IV Continuous <Continuous>  dextrose 50% Injectable 25 Gram(s) IV Push once  dextrose 50% Injectable 12.5 Gram(s) IV Push once  dextrose 50% Injectable 25 Gram(s) IV Push once  escitalopram 20 milliGRAM(s) Oral daily  fluticasone propionate 50 MICROgram(s)/spray Nasal Spray 1 Spray(s) Both Nostrils two times a day  gabapentin 100 milliGRAM(s) Oral once  glucagon  Injectable 1 milliGRAM(s) IntraMuscular once  heparin   Injectable 5000 Unit(s) SubCutaneous every 8 hours  insulin lispro (ADMELOG) corrective regimen sliding scale   SubCutaneous at bedtime  insulin lispro (ADMELOG) corrective regimen sliding scale   SubCutaneous three times a day before meals  metoprolol tartrate 12.5 milliGRAM(s) Oral every 8 hours  mupirocin 2% Ointment 1 Application(s) Both Nostrils two times a day  pantoprazole    Tablet 40 milliGRAM(s) Oral before breakfast  polyethylene glycol 3350 17 Gram(s) Oral daily  sodium chloride 0.9%. 1000 milliLiter(s) (230 mL/Hr) IV Continuous <Continuous>  sodium chloride 0.9%. 1000 milliLiter(s) (50 mL/Hr) IV Continuous <Continuous>    MEDICATIONS  (PRN):  acetaminophen     Tablet .. 650 milliGRAM(s) Oral every 6 hours PRN Mild Pain (1 - 3)  dextrose Oral Gel 15 Gram(s) Oral once PRN Blood Glucose LESS THAN 70 milliGRAM(s)/deciliter

## 2024-04-05 NOTE — BRIEF OPERATIVE NOTE - COMMENTS
I first assisted for the entirety of the case, including but not limited to conduit harvest, distal/proximal anastamoses, and chest closure.

## 2024-04-05 NOTE — PROGRESS NOTE ADULT - SUBJECTIVE AND OBJECTIVE BOX
CTICU  CRITICAL  CARE  attending     Hand off received 					   Pertinent clinical, laboratory, radiographic, hemodynamic, echocardiographic, respiratory data, microbiologic data and chart were reviewed and analyzed frequently throughout the course of the day and night      78 year old male with HTN, DM-II, Hypercholesterolemia, prostate Ca (s/p radiation, no current treatment), CKD.  He was evaluated for ANGINA.   He had a regular exercise stress test that was abnormal -- (Test performed by his cardiologist Dr Morton).  The patient achieved 4.5 minutes on treadmill, reached target HR of 129bpm. He had chest discomfort at 3rd minute, EKG showed PVCs, no ST-T changes. Chest pain resolved once he stopped exercise.   Shortly after, EKG showed depressed ST segments in inferolateral leads took 7 minutes to return to the baseline.   He was sent home with recommendation to return for a planned nuclear stress test the next day.   However, he had multiple episodes of chest pain, SOB after leaving the office, therefore, he came to the ER for evaluation.  LHC: Triple vessel CAD.     S/P Off pump CABG x 3.       Major HEALTH ISSUES - PROBLEM Dx:  Chest pain  Anemia  HTN (hypertension)  Diabetes Mellitis   Hypercholesterolemia  Prostate cancer  Chronic kidney disease, unspecified CKD stage        FAMILY HISTORY:  FH: CAD (coronary artery disease) (Father, Sibling)    PAST MEDICAL & SURGICAL HISTORY:  Gout  Prostate cancer  IBS (irritable bowel syndrome)  Hypertension  Diabetes Mellitis   Hypercholesterolemia  H/O arthritis  Genital herpes  Obstructive sleep apnea  Shingles  History of tonsillectomy  S/P tooth extraction  Injury of meniscus of left knee          14 system review was unremarkable    Vital signs, hemodynamic and respiratory parameters were reviewed from the bedside nursing flow sheet.  ICU Vital Signs Last 24 Hrs  T(C): 36.4 (05 Apr 2024 21:50), Max: 36.7 (05 Apr 2024 09:00)  T(F): 97.6 (05 Apr 2024 21:50), Max: 98 (05 Apr 2024 09:00)  HR: 72 (05 Apr 2024 22:00) (53 - 73)  BP: 97/59 (05 Apr 2024 20:30) (89/56 - 133/69)  BP(mean): 73 (05 Apr 2024 20:30) (68 - 94)  ABP: 93/54 (05 Apr 2024 22:00) (91/50 - 137/67)  ABP(mean): 68 (05 Apr 2024 22:00) (64 - 94)  RR: 14 (05 Apr 2024 22:00) (6 - 18)  SpO2: 100% (05 Apr 2024 22:00) (95% - 100%)    O2 Parameters below as of 05 Apr 2024 22:00  Patient On (Oxygen Delivery Method): ventilator    O2 Concentration (%): 40      Adult Advanced Hemodynamics Last 24 Hrs  CVP(mm Hg): 6 (05 Apr 2024 22:00) (2 - 9)  CVP(cm H2O): --  CO: --  CI: --  PA: --  PA(mean): --  PCWP: --  SVR: --  SVRI: --  PVR: --  PVRI: --, ABG - ( 05 Apr 2024 21:30 )  pH, Arterial: 7.34  pH, Blood: x     /  pCO2: 41    /  pO2: 173   / HCO3: 22    / Base Excess: -3.5  /  SaO2: 99.0              Mode: SIMV (Synchronized Intermittent Mandatory Ventilation)  RR (machine): 14  TV (machine): 500  FiO2: 40  PEEP: 5  PS: 12  ITime: 1  MAP: 7  PIP: 14    Intake and output was reviewed and the fluid balance was calculated  Daily Height in cm: 160.02 (05 Apr 2024 12:36)    Daily   I&O's Summary    04 Apr 2024 07:01  -  05 Apr 2024 07:00  --------------------------------------------------------  IN: 640 mL / OUT: 1975 mL / NET: -1335 mL    05 Apr 2024 07:01  -  05 Apr 2024 22:21  --------------------------------------------------------  IN: 411.3 mL / OUT: 520 mL / NET: -108.7 mL        All lines and drain sites were assessed        Neuro: No change in the Neuro status from the baseline. Follows commands. Moves all 4 extremities.  Neck: No JVD.  CVS: S1, S2, No S3.  Lungs: Good air entry bilaterally.  Abd: Soft. No tenderness. + Bowel sounds.  Vascular: + DP/PT.  Extremities: No edema.  Lymphatic: Normal.  Skin: No abnormalities.      labs  CBC Full  -  ( 05 Apr 2024 21:45 )  WBC Count : 12.77 K/uL  RBC Count : 2.97 M/uL  Hemoglobin : 9.1 g/dL  Hematocrit : 28.0 %  Platelet Count - Automated : 170 K/uL  Mean Cell Volume : 94.3 fl  Mean Cell Hemoglobin : 30.6 pg  Mean Cell Hemoglobin Concentration : 32.5 gm/dL  Auto Neutrophil # : 10.84 K/uL  Auto Lymphocyte # : 0.83 K/uL  Auto Monocyte # : 0.98 K/uL  Auto Eosinophil # : 0.01 K/uL  Auto Basophil # : 0.04 K/uL  Auto Neutrophil % : 84.9 %  Auto Lymphocyte % : 6.5 %  Auto Monocyte % : 7.7 %  Auto Eosinophil % : 0.1 %  Auto Basophil % : 0.3 %    04-05    138  |  107  |  37<H>  ----------------------------<  197<H>  x    |  20<L>  |  1.76<H>    Ca    8.9      05 Apr 2024 21:45  Phos  5.1     04-05  Mg     2.4     04-05    TPro  5.3<L>  /  Alb  3.3  /  TBili  0.3  /  DBili  x   /  AST  32  /  ALT  25  /  AlkPhos  74  04-05    PT/INR - ( 05 Apr 2024 21:45 )   PT: 11.4 sec;   INR: 1.00          PTT - ( 05 Apr 2024 21:45 )  PTT:54.6 sec  The current medications were reviewed   MEDICATIONS  (STANDING):  allopurinol 300 milliGRAM(s) Oral daily  ascorbic acid 500 milliGRAM(s) Oral daily  aspirin enteric coated 81 milliGRAM(s) Oral daily  atorvastatin 40 milliGRAM(s) Oral at bedtime  buPROPion XL (24-Hour) . 150 milliGRAM(s) Oral daily  chlorhexidine 0.12% Liquid 15 milliLiter(s) Oral Mucosa every 12 hours  chlorhexidine 2% Cloths 1 Application(s) Topical daily  clopidogrel Tablet 75 milliGRAM(s) Oral daily  dexMEDEtomidine Infusion 0.2 MICROgram(s)/kG/Hr (3.43 mL/Hr) IV Continuous <Continuous>  dextrose 50% Injectable 50 milliLiter(s) IV Push every 15 minutes  dextrose 50% Injectable 25 milliLiter(s) IV Push every 15 minutes  escitalopram 20 milliGRAM(s) Oral daily  heparin   Injectable 5000 Unit(s) SubCutaneous every 8 hours  insulin regular Infusion 1 Unit(s)/Hr (1 mL/Hr) IV Continuous <Continuous>  mupirocin 2% Nasal 1 Application(s) Both Nostrils two times a day  norepinephrine Infusion 0.05 MICROgram(s)/kG/Min (6.43 mL/Hr) IV Continuous <Continuous>  pantoprazole    Tablet 40 milliGRAM(s) Oral before breakfast  sodium chloride 0.9%. 1000 milliLiter(s) (230 mL/Hr) IV Continuous <Continuous>  sodium chloride 0.9%. 1000 milliLiter(s) (50 mL/Hr) IV Continuous <Continuous>  sodium chloride 0.9%. 1000 milliLiter(s) (10 mL/Hr) IV Continuous <Continuous>  vancomycin  IVPB 1000 milliGRAM(s) IV Intermittent every 24 hours    MEDICATIONS  (PRN):        PROBLEM LIST/ ASSESSMENT:  HEALTH ISSUES - PROBLEM Dx:  Chest pain  Anemia  HTN (hypertension  Diabetes Mellitis  Hypercholesterolemia  Prostate cancer  Chronic kidney disease, unspecified CKD stage            78 year old  Male admitted with triple vessel CAD.  S/P Off pump CABG x 3 (LIMA to LAD and diagonal, SVG to OM).   MARLEN and CKD  Metabolic acidosis.  Hyperglycemia  Hyperkalemia  Hemodynamically stable.  Good oxygenation.  Fair urine out put.            My plan includes :  Hyperkalemia Rx.  OPTIMIZE Glycemic Control.  START IV insulin infusion.   WEAN vent as tolerated.  D/C Norepinephrine.  Dual antiplatelet Rx.  Close hemodynamic, ventilatory and drain monitoring and management  Monitor for arrhythmias and monitor parameters for organ perfusion  Monitor neurologic status  Monitor renal function.  Head of the bed should remain elevated to 45 deg .   Chest PT and IS will be encouraged  Monitor adequacy of oxygenation and ventilation and attempt to wean oxygen  Nutritional goals will be met using po eventually , ensure adequate caloric intake and monitor the same  Stress ulcer and VTE prophylaxis will be achieved    Electrolytes have been repleted as necessary and wound care has been carried out. Pain control has been achieved.   Aggressive physical therapy and early mobility and ambulation goals will be met   The family was updated about the course and plan  CRITICAL CARE TIME SPENT in evaluation and management, reassessments, review and interpretation of labs and x-rays, ventilator and hemodynamic management, formulating a plan and coordinating care: ___60____ MIN.  Time does not include procedural time.  CTICU ATTENDING     					    Milan Isaac MD

## 2024-04-06 LAB
ALBUMIN SERPL ELPH-MCNC: 3.3 G/DL — SIGNIFICANT CHANGE UP (ref 3.3–5)
ALBUMIN SERPL ELPH-MCNC: 3.6 G/DL — SIGNIFICANT CHANGE UP (ref 3.3–5)
ALBUMIN SERPL ELPH-MCNC: 3.8 G/DL — SIGNIFICANT CHANGE UP (ref 3.3–5)
ALP SERPL-CCNC: 57 U/L — SIGNIFICANT CHANGE UP (ref 40–120)
ALP SERPL-CCNC: 64 U/L — SIGNIFICANT CHANGE UP (ref 40–120)
ALP SERPL-CCNC: 71 U/L — SIGNIFICANT CHANGE UP (ref 40–120)
ALT FLD-CCNC: 24 U/L — SIGNIFICANT CHANGE UP (ref 10–45)
ALT FLD-CCNC: 24 U/L — SIGNIFICANT CHANGE UP (ref 10–45)
ALT FLD-CCNC: 27 U/L — SIGNIFICANT CHANGE UP (ref 10–45)
ANION GAP SERPL CALC-SCNC: 10 MMOL/L — SIGNIFICANT CHANGE UP (ref 5–17)
ANION GAP SERPL CALC-SCNC: 12 MMOL/L — SIGNIFICANT CHANGE UP (ref 5–17)
ANION GAP SERPL CALC-SCNC: 12 MMOL/L — SIGNIFICANT CHANGE UP (ref 5–17)
ANISOCYTOSIS BLD QL: SLIGHT — SIGNIFICANT CHANGE UP
APPEARANCE UR: CLEAR — SIGNIFICANT CHANGE UP
APTT BLD: 30.2 SEC — SIGNIFICANT CHANGE UP (ref 24.5–35.6)
APTT BLD: 31.9 SEC — SIGNIFICANT CHANGE UP (ref 24.5–35.6)
APTT BLD: 33.2 SEC — SIGNIFICANT CHANGE UP (ref 24.5–35.6)
AST SERPL-CCNC: 27 U/L — SIGNIFICANT CHANGE UP (ref 10–40)
AST SERPL-CCNC: 29 U/L — SIGNIFICANT CHANGE UP (ref 10–40)
AST SERPL-CCNC: 33 U/L — SIGNIFICANT CHANGE UP (ref 10–40)
BACTERIA # UR AUTO: NEGATIVE /HPF — SIGNIFICANT CHANGE UP
BASOPHILS # BLD AUTO: 0 K/UL — SIGNIFICANT CHANGE UP (ref 0–0.2)
BASOPHILS # BLD AUTO: 0.02 K/UL — SIGNIFICANT CHANGE UP (ref 0–0.2)
BASOPHILS # BLD AUTO: 0.02 K/UL — SIGNIFICANT CHANGE UP (ref 0–0.2)
BASOPHILS NFR BLD AUTO: 0 % — SIGNIFICANT CHANGE UP (ref 0–2)
BASOPHILS NFR BLD AUTO: 0.2 % — SIGNIFICANT CHANGE UP (ref 0–2)
BASOPHILS NFR BLD AUTO: 0.2 % — SIGNIFICANT CHANGE UP (ref 0–2)
BILIRUB SERPL-MCNC: 0.2 MG/DL — SIGNIFICANT CHANGE UP (ref 0.2–1.2)
BILIRUB SERPL-MCNC: 0.3 MG/DL — SIGNIFICANT CHANGE UP (ref 0.2–1.2)
BILIRUB SERPL-MCNC: 0.4 MG/DL — SIGNIFICANT CHANGE UP (ref 0.2–1.2)
BILIRUB UR-MCNC: NEGATIVE — SIGNIFICANT CHANGE UP
BUN SERPL-MCNC: 39 MG/DL — HIGH (ref 7–23)
BUN SERPL-MCNC: 42 MG/DL — HIGH (ref 7–23)
BUN SERPL-MCNC: 46 MG/DL — HIGH (ref 7–23)
CALCIUM SERPL-MCNC: 8.9 MG/DL — SIGNIFICANT CHANGE UP (ref 8.4–10.5)
CALCIUM SERPL-MCNC: 8.9 MG/DL — SIGNIFICANT CHANGE UP (ref 8.4–10.5)
CALCIUM SERPL-MCNC: 9.8 MG/DL — SIGNIFICANT CHANGE UP (ref 8.4–10.5)
CAST: 13 /LPF — HIGH (ref 0–4)
CHLORIDE SERPL-SCNC: 101 MMOL/L — SIGNIFICANT CHANGE UP (ref 96–108)
CHLORIDE SERPL-SCNC: 102 MMOL/L — SIGNIFICANT CHANGE UP (ref 96–108)
CHLORIDE SERPL-SCNC: 108 MMOL/L — SIGNIFICANT CHANGE UP (ref 96–108)
CO2 SERPL-SCNC: 21 MMOL/L — LOW (ref 22–31)
CO2 SERPL-SCNC: 22 MMOL/L — SIGNIFICANT CHANGE UP (ref 22–31)
CO2 SERPL-SCNC: 23 MMOL/L — SIGNIFICANT CHANGE UP (ref 22–31)
COLOR SPEC: YELLOW — SIGNIFICANT CHANGE UP
CREAT SERPL-MCNC: 1.83 MG/DL — HIGH (ref 0.5–1.3)
CREAT SERPL-MCNC: 1.98 MG/DL — HIGH (ref 0.5–1.3)
CREAT SERPL-MCNC: 2.17 MG/DL — HIGH (ref 0.5–1.3)
DIFF PNL FLD: NEGATIVE — SIGNIFICANT CHANGE UP
EGFR: 31 ML/MIN/1.73M2 — LOW
EGFR: 34 ML/MIN/1.73M2 — LOW
EGFR: 38 ML/MIN/1.73M2 — LOW
EOSINOPHIL # BLD AUTO: 0 K/UL — SIGNIFICANT CHANGE UP (ref 0–0.5)
EOSINOPHIL # BLD AUTO: 0 K/UL — SIGNIFICANT CHANGE UP (ref 0–0.5)
EOSINOPHIL # BLD AUTO: 0.02 K/UL — SIGNIFICANT CHANGE UP (ref 0–0.5)
EOSINOPHIL NFR BLD AUTO: 0 % — SIGNIFICANT CHANGE UP (ref 0–6)
EOSINOPHIL NFR BLD AUTO: 0 % — SIGNIFICANT CHANGE UP (ref 0–6)
EOSINOPHIL NFR BLD AUTO: 0.2 % — SIGNIFICANT CHANGE UP (ref 0–6)
FINE GRAN CASTS #/AREA URNS AUTO: PRESENT
GAS PNL BLDA: SIGNIFICANT CHANGE UP
GIANT PLATELETS BLD QL SMEAR: PRESENT — SIGNIFICANT CHANGE UP
GLUCOSE BLDC GLUCOMTR-MCNC: 119 MG/DL — HIGH (ref 70–99)
GLUCOSE BLDC GLUCOMTR-MCNC: 119 MG/DL — HIGH (ref 70–99)
GLUCOSE BLDC GLUCOMTR-MCNC: 121 MG/DL — HIGH (ref 70–99)
GLUCOSE BLDC GLUCOMTR-MCNC: 139 MG/DL — HIGH (ref 70–99)
GLUCOSE BLDC GLUCOMTR-MCNC: 143 MG/DL — HIGH (ref 70–99)
GLUCOSE BLDC GLUCOMTR-MCNC: 166 MG/DL — HIGH (ref 70–99)
GLUCOSE BLDC GLUCOMTR-MCNC: 168 MG/DL — HIGH (ref 70–99)
GLUCOSE BLDC GLUCOMTR-MCNC: 236 MG/DL — HIGH (ref 70–99)
GLUCOSE BLDC GLUCOMTR-MCNC: 87 MG/DL — SIGNIFICANT CHANGE UP (ref 70–99)
GLUCOSE BLDC GLUCOMTR-MCNC: 92 MG/DL — SIGNIFICANT CHANGE UP (ref 70–99)
GLUCOSE SERPL-MCNC: 140 MG/DL — HIGH (ref 70–99)
GLUCOSE SERPL-MCNC: 162 MG/DL — HIGH (ref 70–99)
GLUCOSE SERPL-MCNC: 234 MG/DL — HIGH (ref 70–99)
GLUCOSE UR QL: NEGATIVE MG/DL — SIGNIFICANT CHANGE UP
HCT VFR BLD CALC: 21.2 % — LOW (ref 39–50)
HCT VFR BLD CALC: 23.4 % — LOW (ref 39–50)
HCT VFR BLD CALC: 27 % — LOW (ref 39–50)
HGB BLD-MCNC: 6.9 G/DL — CRITICAL LOW (ref 13–17)
HGB BLD-MCNC: 7.8 G/DL — LOW (ref 13–17)
HGB BLD-MCNC: 8.9 G/DL — LOW (ref 13–17)
HYPOCHROMIA BLD QL: SLIGHT — SIGNIFICANT CHANGE UP
IMM GRANULOCYTES NFR BLD AUTO: 0.6 % — SIGNIFICANT CHANGE UP (ref 0–0.9)
IMM GRANULOCYTES NFR BLD AUTO: 0.6 % — SIGNIFICANT CHANGE UP (ref 0–0.9)
INR BLD: 0.98 — SIGNIFICANT CHANGE UP (ref 0.85–1.18)
INR BLD: 0.99 — SIGNIFICANT CHANGE UP (ref 0.85–1.18)
KETONES UR-MCNC: NEGATIVE MG/DL — SIGNIFICANT CHANGE UP
LEUKOCYTE ESTERASE UR-ACNC: ABNORMAL
LYMPHOCYTES # BLD AUTO: 0.49 K/UL — LOW (ref 1–3.3)
LYMPHOCYTES # BLD AUTO: 0.57 K/UL — LOW (ref 1–3.3)
LYMPHOCYTES # BLD AUTO: 0.82 K/UL — LOW (ref 1–3.3)
LYMPHOCYTES # BLD AUTO: 5 % — LOW (ref 13–44)
LYMPHOCYTES # BLD AUTO: 6.2 % — LOW (ref 13–44)
LYMPHOCYTES # BLD AUTO: 9.9 % — LOW (ref 13–44)
MAGNESIUM SERPL-MCNC: 2.1 MG/DL — SIGNIFICANT CHANGE UP (ref 1.6–2.6)
MAGNESIUM SERPL-MCNC: 2.2 MG/DL — SIGNIFICANT CHANGE UP (ref 1.6–2.6)
MAGNESIUM SERPL-MCNC: 2.6 MG/DL — SIGNIFICANT CHANGE UP (ref 1.6–2.6)
MANUAL SMEAR VERIFICATION: SIGNIFICANT CHANGE UP
MCHC RBC-ENTMCNC: 31.2 PG — SIGNIFICANT CHANGE UP (ref 27–34)
MCHC RBC-ENTMCNC: 31.2 PG — SIGNIFICANT CHANGE UP (ref 27–34)
MCHC RBC-ENTMCNC: 31.5 PG — SIGNIFICANT CHANGE UP (ref 27–34)
MCHC RBC-ENTMCNC: 32.5 GM/DL — SIGNIFICANT CHANGE UP (ref 32–36)
MCHC RBC-ENTMCNC: 33 GM/DL — SIGNIFICANT CHANGE UP (ref 32–36)
MCHC RBC-ENTMCNC: 33.3 GM/DL — SIGNIFICANT CHANGE UP (ref 32–36)
MCV RBC AUTO: 94.4 FL — SIGNIFICANT CHANGE UP (ref 80–100)
MCV RBC AUTO: 94.7 FL — SIGNIFICANT CHANGE UP (ref 80–100)
MCV RBC AUTO: 95.9 FL — SIGNIFICANT CHANGE UP (ref 80–100)
MICROCYTES BLD QL: SLIGHT — SIGNIFICANT CHANGE UP
MONOCYTES # BLD AUTO: 0.14 K/UL — SIGNIFICANT CHANGE UP (ref 0–0.9)
MONOCYTES # BLD AUTO: 0.91 K/UL — HIGH (ref 0–0.9)
MONOCYTES # BLD AUTO: 1.06 K/UL — HIGH (ref 0–0.9)
MONOCYTES NFR BLD AUTO: 1.8 % — LOW (ref 2–14)
MONOCYTES NFR BLD AUTO: 11 % — SIGNIFICANT CHANGE UP (ref 2–14)
MONOCYTES NFR BLD AUTO: 9.3 % — SIGNIFICANT CHANGE UP (ref 2–14)
NEUTROPHILS # BLD AUTO: 6.47 K/UL — SIGNIFICANT CHANGE UP (ref 1.8–7.4)
NEUTROPHILS # BLD AUTO: 7.22 K/UL — SIGNIFICANT CHANGE UP (ref 1.8–7.4)
NEUTROPHILS # BLD AUTO: 9.64 K/UL — HIGH (ref 1.8–7.4)
NEUTROPHILS NFR BLD AUTO: 78.1 % — HIGH (ref 43–77)
NEUTROPHILS NFR BLD AUTO: 84.9 % — HIGH (ref 43–77)
NEUTROPHILS NFR BLD AUTO: 91.1 % — HIGH (ref 43–77)
NITRITE UR-MCNC: NEGATIVE — SIGNIFICANT CHANGE UP
NRBC # BLD: 0 /100 WBCS — SIGNIFICANT CHANGE UP (ref 0–0)
NRBC # BLD: 0 /100 WBCS — SIGNIFICANT CHANGE UP (ref 0–0)
OVALOCYTES BLD QL SMEAR: SLIGHT — SIGNIFICANT CHANGE UP
PH UR: 5.5 — SIGNIFICANT CHANGE UP (ref 5–8)
PHOSPHATE SERPL-MCNC: 5.7 MG/DL — HIGH (ref 2.5–4.5)
PHOSPHATE SERPL-MCNC: 6.4 MG/DL — HIGH (ref 2.5–4.5)
PHOSPHATE SERPL-MCNC: 6.6 MG/DL — HIGH (ref 2.5–4.5)
PLAT MORPH BLD: NORMAL — SIGNIFICANT CHANGE UP
PLATELET # BLD AUTO: 110 K/UL — LOW (ref 150–400)
PLATELET # BLD AUTO: 117 K/UL — LOW (ref 150–400)
PLATELET # BLD AUTO: 169 K/UL — SIGNIFICANT CHANGE UP (ref 150–400)
POIKILOCYTOSIS BLD QL AUTO: SLIGHT — SIGNIFICANT CHANGE UP
POLYCHROMASIA BLD QL SMEAR: SLIGHT — SIGNIFICANT CHANGE UP
POTASSIUM SERPL-MCNC: 4.9 MMOL/L — SIGNIFICANT CHANGE UP (ref 3.5–5.3)
POTASSIUM SERPL-MCNC: 4.9 MMOL/L — SIGNIFICANT CHANGE UP (ref 3.5–5.3)
POTASSIUM SERPL-MCNC: 5.2 MMOL/L — SIGNIFICANT CHANGE UP (ref 3.5–5.3)
POTASSIUM SERPL-SCNC: 4.9 MMOL/L — SIGNIFICANT CHANGE UP (ref 3.5–5.3)
POTASSIUM SERPL-SCNC: 4.9 MMOL/L — SIGNIFICANT CHANGE UP (ref 3.5–5.3)
POTASSIUM SERPL-SCNC: 5.2 MMOL/L — SIGNIFICANT CHANGE UP (ref 3.5–5.3)
PROT SERPL-MCNC: 5.1 G/DL — LOW (ref 6–8.3)
PROT SERPL-MCNC: 5.5 G/DL — LOW (ref 6–8.3)
PROT SERPL-MCNC: 5.5 G/DL — LOW (ref 6–8.3)
PROT UR-MCNC: NEGATIVE MG/DL — SIGNIFICANT CHANGE UP
PROTHROM AB SERPL-ACNC: 11.2 SEC — SIGNIFICANT CHANGE UP (ref 9.5–13)
PROTHROM AB SERPL-ACNC: 11.3 SEC — SIGNIFICANT CHANGE UP (ref 9.5–13)
RBC # BLD: 2.21 M/UL — LOW (ref 4.2–5.8)
RBC # BLD: 2.48 M/UL — LOW (ref 4.2–5.8)
RBC # BLD: 2.85 M/UL — LOW (ref 4.2–5.8)
RBC # FLD: 14.5 % — SIGNIFICANT CHANGE UP (ref 10.3–14.5)
RBC # FLD: 14.7 % — HIGH (ref 10.3–14.5)
RBC # FLD: 14.9 % — HIGH (ref 10.3–14.5)
RBC BLD AUTO: ABNORMAL
RBC CASTS # UR COMP ASSIST: 2 /HPF — SIGNIFICANT CHANGE UP (ref 0–4)
SODIUM SERPL-SCNC: 134 MMOL/L — LOW (ref 135–145)
SODIUM SERPL-SCNC: 137 MMOL/L — SIGNIFICANT CHANGE UP (ref 135–145)
SODIUM SERPL-SCNC: 140 MMOL/L — SIGNIFICANT CHANGE UP (ref 135–145)
SP GR SPEC: 1.02 — SIGNIFICANT CHANGE UP (ref 1–1.03)
SQUAMOUS # UR AUTO: 1 /HPF — SIGNIFICANT CHANGE UP (ref 0–5)
UROBILINOGEN FLD QL: 0.2 MG/DL — SIGNIFICANT CHANGE UP (ref 0.2–1)
VARIANT LYMPHS # BLD: 0.9 % — SIGNIFICANT CHANGE UP (ref 0–6)
WBC # BLD: 11.36 K/UL — HIGH (ref 3.8–10.5)
WBC # BLD: 7.93 K/UL — SIGNIFICANT CHANGE UP (ref 3.8–10.5)
WBC # BLD: 8.29 K/UL — SIGNIFICANT CHANGE UP (ref 3.8–10.5)
WBC # FLD AUTO: 11.36 K/UL — HIGH (ref 3.8–10.5)
WBC # FLD AUTO: 7.93 K/UL — SIGNIFICANT CHANGE UP (ref 3.8–10.5)
WBC # FLD AUTO: 8.29 K/UL — SIGNIFICANT CHANGE UP (ref 3.8–10.5)
WBC UR QL: 7 /HPF — HIGH (ref 0–5)

## 2024-04-06 PROCEDURE — 71045 X-RAY EXAM CHEST 1 VIEW: CPT | Mod: 26

## 2024-04-06 RX ORDER — DEXTROSE 10 % IN WATER 10 %
125 INTRAVENOUS SOLUTION INTRAVENOUS ONCE
Refills: 0 | Status: DISCONTINUED | OUTPATIENT
Start: 2024-04-06 | End: 2024-04-08

## 2024-04-06 RX ORDER — SODIUM CHLORIDE 9 MG/ML
1000 INJECTION, SOLUTION INTRAVENOUS
Refills: 0 | Status: DISCONTINUED | OUTPATIENT
Start: 2024-04-06 | End: 2024-04-08

## 2024-04-06 RX ORDER — GLUCAGON INJECTION, SOLUTION 0.5 MG/.1ML
1 INJECTION, SOLUTION SUBCUTANEOUS ONCE
Refills: 0 | Status: DISCONTINUED | OUTPATIENT
Start: 2024-04-06 | End: 2024-04-08

## 2024-04-06 RX ORDER — METOPROLOL TARTRATE 50 MG
25 TABLET ORAL
Refills: 0 | Status: DISCONTINUED | OUTPATIENT
Start: 2024-04-06 | End: 2024-04-07

## 2024-04-06 RX ORDER — OXYCODONE HYDROCHLORIDE 5 MG/1
5 TABLET ORAL EVERY 4 HOURS
Refills: 0 | Status: DISCONTINUED | OUTPATIENT
Start: 2024-04-06 | End: 2024-04-09

## 2024-04-06 RX ORDER — ALBUMIN HUMAN 25 %
250 VIAL (ML) INTRAVENOUS
Refills: 0 | Status: COMPLETED | OUTPATIENT
Start: 2024-04-06 | End: 2024-04-06

## 2024-04-06 RX ORDER — ACETAMINOPHEN 500 MG
650 TABLET ORAL EVERY 6 HOURS
Refills: 0 | Status: DISCONTINUED | OUTPATIENT
Start: 2024-04-06 | End: 2024-04-08

## 2024-04-06 RX ORDER — INSULIN LISPRO 100/ML
VIAL (ML) SUBCUTANEOUS
Refills: 0 | Status: DISCONTINUED | OUTPATIENT
Start: 2024-04-06 | End: 2024-04-08

## 2024-04-06 RX ORDER — HYDROMORPHONE HYDROCHLORIDE 2 MG/ML
0.5 INJECTION INTRAMUSCULAR; INTRAVENOUS; SUBCUTANEOUS ONCE
Refills: 0 | Status: DISCONTINUED | OUTPATIENT
Start: 2024-04-06 | End: 2024-04-06

## 2024-04-06 RX ORDER — DEXTROSE 50 % IN WATER 50 %
15 SYRINGE (ML) INTRAVENOUS ONCE
Refills: 0 | Status: DISCONTINUED | OUTPATIENT
Start: 2024-04-06 | End: 2024-04-08

## 2024-04-06 RX ORDER — METOCLOPRAMIDE HCL 10 MG
10 TABLET ORAL ONCE
Refills: 0 | Status: DISCONTINUED | OUTPATIENT
Start: 2024-04-06 | End: 2024-04-08

## 2024-04-06 RX ADMIN — OXYCODONE HYDROCHLORIDE 5 MILLIGRAM(S): 5 TABLET ORAL at 19:35

## 2024-04-06 RX ADMIN — HEPARIN SODIUM 5000 UNIT(S): 5000 INJECTION INTRAVENOUS; SUBCUTANEOUS at 14:59

## 2024-04-06 RX ADMIN — Medication 125 MILLILITER(S): at 09:46

## 2024-04-06 RX ADMIN — CHLORHEXIDINE GLUCONATE 15 MILLILITER(S): 213 SOLUTION TOPICAL at 19:35

## 2024-04-06 RX ADMIN — OXYCODONE HYDROCHLORIDE 5 MILLIGRAM(S): 5 TABLET ORAL at 10:00

## 2024-04-06 RX ADMIN — HYDROMORPHONE HYDROCHLORIDE 0.5 MILLIGRAM(S): 2 INJECTION INTRAMUSCULAR; INTRAVENOUS; SUBCUTANEOUS at 01:39

## 2024-04-06 RX ADMIN — HEPARIN SODIUM 5000 UNIT(S): 5000 INJECTION INTRAVENOUS; SUBCUTANEOUS at 21:50

## 2024-04-06 RX ADMIN — OXYCODONE HYDROCHLORIDE 5 MILLIGRAM(S): 5 TABLET ORAL at 20:07

## 2024-04-06 RX ADMIN — HEPARIN SODIUM 5000 UNIT(S): 5000 INJECTION INTRAVENOUS; SUBCUTANEOUS at 06:25

## 2024-04-06 RX ADMIN — PANTOPRAZOLE SODIUM 40 MILLIGRAM(S): 20 TABLET, DELAYED RELEASE ORAL at 06:25

## 2024-04-06 RX ADMIN — HYDROMORPHONE HYDROCHLORIDE 0.5 MILLIGRAM(S): 2 INJECTION INTRAMUSCULAR; INTRAVENOUS; SUBCUTANEOUS at 02:06

## 2024-04-06 RX ADMIN — ATORVASTATIN CALCIUM 40 MILLIGRAM(S): 80 TABLET, FILM COATED ORAL at 21:51

## 2024-04-06 RX ADMIN — Medication 650 MILLIGRAM(S): at 23:46

## 2024-04-06 RX ADMIN — Medication 4: at 11:53

## 2024-04-06 RX ADMIN — ESCITALOPRAM OXALATE 20 MILLIGRAM(S): 10 TABLET, FILM COATED ORAL at 11:54

## 2024-04-06 RX ADMIN — Medication 2: at 21:50

## 2024-04-06 RX ADMIN — Medication 125 MILLILITER(S): at 07:28

## 2024-04-06 RX ADMIN — MUPIROCIN 1 APPLICATION(S): 20 OINTMENT TOPICAL at 19:44

## 2024-04-06 RX ADMIN — CHLORHEXIDINE GLUCONATE 1 APPLICATION(S): 213 SOLUTION TOPICAL at 12:05

## 2024-04-06 RX ADMIN — POLYETHYLENE GLYCOL 3350 17 GRAM(S): 17 POWDER, FOR SOLUTION ORAL at 11:55

## 2024-04-06 RX ADMIN — OXYCODONE HYDROCHLORIDE 5 MILLIGRAM(S): 5 TABLET ORAL at 23:46

## 2024-04-06 RX ADMIN — Medication 25 MILLIGRAM(S): at 22:50

## 2024-04-06 RX ADMIN — Medication 250 MILLIGRAM(S): at 10:23

## 2024-04-06 RX ADMIN — Medication 500 MILLIGRAM(S): at 11:54

## 2024-04-06 RX ADMIN — Medication 650 MILLIGRAM(S): at 06:25

## 2024-04-06 RX ADMIN — Medication 300 MILLIGRAM(S): at 11:55

## 2024-04-06 RX ADMIN — BUPROPION HYDROCHLORIDE 150 MILLIGRAM(S): 150 TABLET, EXTENDED RELEASE ORAL at 11:55

## 2024-04-06 RX ADMIN — CLOPIDOGREL BISULFATE 75 MILLIGRAM(S): 75 TABLET, FILM COATED ORAL at 11:54

## 2024-04-06 RX ADMIN — Medication 81 MILLIGRAM(S): at 11:55

## 2024-04-06 RX ADMIN — Medication 650 MILLIGRAM(S): at 07:30

## 2024-04-06 RX ADMIN — OXYCODONE HYDROCHLORIDE 5 MILLIGRAM(S): 5 TABLET ORAL at 09:47

## 2024-04-06 RX ADMIN — MUPIROCIN 1 APPLICATION(S): 20 OINTMENT TOPICAL at 06:25

## 2024-04-06 NOTE — PHYSICAL THERAPY INITIAL EVALUATION ADULT - PERTINENT HX OF CURRENT PROBLEM, REHAB EVAL
This is a 77 y/o male with HTN, DM-II, Hypercholesterolemia, prostate Ca (s/p radiation, no current treatment), CKD, presents to the [facility] ER with chest pain that started over last 4-5 weeks. Pain initially started while walking in [place], relieved with rest. Since then, he has been experiencing pain at rest and minimal exertion, increased in frequency and associated with SOB. Pain is rated 7-8/10, lasting a few minutes. He had an episode of nausea and vomiting day before admission. Patient is s/p CABGx3 on 4/5/2024.

## 2024-04-06 NOTE — PROGRESS NOTE ADULT - SUBJECTIVE AND OBJECTIVE BOX
CTICU  CRITICAL  CARE  attending     Hand off received 					   Pertinent clinical, laboratory, radiographic, hemodynamic, echocardiographic, respiratory data, microbiologic data and chart were reviewed and analyzed frequently throughout the course of the day and night  Patient seen and examined with CTS/ SH attending at bedside    Pt is a 78y , Male, post op day # 1 s/p CABG x 3; off pump; post op EF nl    post op:    remains extubated  acute post Hemorrhagic anemia  s/p 1 unit PRBC  borderline BP ( systolic 's)  Stable Cr ( hx of CKD)      Chest pain    Anemia    HTN (hypertension)    Diabetes    Hypercholesterolemia    Prostate cancer    Chronic kidney disease, unspecified CKD stage        , FAMILY HISTORY:  FH: CAD (coronary artery disease) (Father, Sibling)    PAST MEDICAL & SURGICAL HISTORY:  Gout      Prostate cancer      IBS (irritable bowel syndrome)      Hypertension      Diabetes      Hypercholesterolemia      H/O arthritis      Genital herpes      Obstructive sleep apnea      Shingles      History of tonsillectomy      S/P tooth extraction      Injury of meniscus of left knee        Patient is a 78y old  Male who presents with a chief complaint of CAD (05 Apr 2024 22:21)      14 system review limited 2/2 post op morbidity    Vital signs, hemodynamic and respiratory parameters were reviewed from the bedside nursing flowsheet.  ICU Vital Signs Last 24 Hrs  T(C): 36.6 (06 Apr 2024 09:31), Max: 36.6 (06 Apr 2024 09:31)  T(F): 97.8 (06 Apr 2024 09:31), Max: 97.8 (06 Apr 2024 09:31)  HR: 101 (06 Apr 2024 13:00) (61 - 102)  BP: 106/63 (06 Apr 2024 13:00) (84/53 - 110/62)  BP(mean): 80 (06 Apr 2024 13:00) (64 - 81)  ABP: 142/48 (06 Apr 2024 13:00) (91/50 - 142/48)  ABP(mean): 77 (06 Apr 2024 13:00) (64 - 94)  RR: 20 (06 Apr 2024 13:00) (6 - 20)  SpO2: 94% (06 Apr 2024 13:00) (94% - 100%)    O2 Parameters below as of 06 Apr 2024 14:00  Patient On (Oxygen Delivery Method): room air          Adult Advanced Hemodynamics Last 24 Hrs  CVP(mm Hg): -1 (06 Apr 2024 13:00) (-6 - 9)  CVP(cm H2O): --  CO: --  CI: --  PA: --  PA(mean): --  PCWP: --  SVR: --  SVRI: --  PVR: --  PVRI: --, ABG - ( 06 Apr 2024 11:36 )  pH, Arterial: 7.33  pH, Blood: x     /  pCO2: 40    /  pO2: 79    / HCO3: 21    / Base Excess: -4.5  /  SaO2: 97.2              Mode: SIMV (Synchronized Intermittent Mandatory Ventilation)  RR (machine): 14  TV (machine): 500  FiO2: 40  PEEP: 5  PS: 12  ITime: 1  MAP: 7  PIP: 14    Intake and output was reviewed and the fluid balance was calculated  Daily     Daily   I&O's Summary    05 Apr 2024 07:01  -  06 Apr 2024 07:00  --------------------------------------------------------  IN: 985.7 mL / OUT: 1485 mL / NET: -499.3 mL    06 Apr 2024 07:01  -  06 Apr 2024 13:54  --------------------------------------------------------  IN: 810 mL / OUT: 410 mL / NET: 400 mL        All lines and drain sites were assessed  [facility]      POCT Blood Glucose.: 236 mg/dL (06 Apr 2024 11:42)    No acute change in mental status  Auscultation of the chest reveals equal bs  Abdomen is soft  Extremities are warm and well perfused  Wounds appear clean and unremarkable  Antibiotics are periop    labs  CBC Full  -  ( 06 Apr 2024 11:36 )  WBC Count : 7.93 K/uL  RBC Count : 2.21 M/uL  Hemoglobin : 6.9 g/dL  Hematocrit : 21.2 %  Platelet Count - Automated : 117 K/uL  Mean Cell Volume : 95.9 fl  Mean Cell Hemoglobin : 31.2 pg  Mean Cell Hemoglobin Concentration : 32.5 gm/dL  Auto Neutrophil # : 7.22 K/uL  Auto Lymphocyte # : 0.49 K/uL  Auto Monocyte # : 0.14 K/uL  Auto Eosinophil # : 0.00 K/uL  Auto Basophil # : 0.00 K/uL  Auto Neutrophil % : 91.1 %  Auto Lymphocyte % : 6.2 %  Auto Monocyte % : 1.8 %  Auto Eosinophil % : 0.0 %  Auto Basophil % : 0.0 %    04-06    134<L>  |  101  |  42<H>  ----------------------------<  234<H>  4.9   |  21<L>  |  1.98<H>    Ca    8.9      06 Apr 2024 11:36  Phos  6.4     04-06  Mg     2.2     04-06    TPro  5.5<L>  /  Alb  3.8  /  TBili  0.3  /  DBili  x   /  AST  27  /  ALT  24  /  AlkPhos  57  04-06    PT/INR - ( 06 Apr 2024 02:30 )   PT: 11.3 sec;   INR: 0.99          PTT - ( 06 Apr 2024 11:36 )  PTT:30.2 sec  The current medications were reviewed   MEDICATIONS  (STANDING):  allopurinol 300 milliGRAM(s) Oral daily  ascorbic acid 500 milliGRAM(s) Oral daily  aspirin enteric coated 81 milliGRAM(s) Oral daily  atorvastatin 40 milliGRAM(s) Oral at bedtime  buPROPion XL (24-Hour) . 150 milliGRAM(s) Oral daily  chlorhexidine 0.12% Liquid 15 milliLiter(s) Oral Mucosa every 12 hours  chlorhexidine 2% Cloths 1 Application(s) Topical daily  clopidogrel Tablet 75 milliGRAM(s) Oral daily  dexMEDEtomidine Infusion 0.2 MICROgram(s)/kG/Hr (3.43 mL/Hr) IV Continuous <Continuous>  dextrose 10% Bolus 125 milliLiter(s) IV Bolus once  dextrose 5%. 1000 milliLiter(s) (50 mL/Hr) IV Continuous <Continuous>  dextrose 5%. 1000 milliLiter(s) (100 mL/Hr) IV Continuous <Continuous>  escitalopram 20 milliGRAM(s) Oral daily  glucagon  Injectable 1 milliGRAM(s) IntraMuscular once  heparin   Injectable 5000 Unit(s) SubCutaneous every 8 hours  insulin lispro (ADMELOG) corrective regimen sliding scale   SubCutaneous Before meals and at bedtime  metoclopramide Injectable 10 milliGRAM(s) IV Push once  mupirocin 2% Nasal 1 Application(s) Both Nostrils two times a day  norepinephrine Infusion 0.05 MICROgram(s)/kG/Min (6.43 mL/Hr) IV Continuous <Continuous>  pantoprazole    Tablet 40 milliGRAM(s) Oral before breakfast  polyethylene glycol 3350 17 Gram(s) Oral daily  senna 2 Tablet(s) Oral at bedtime  sodium chloride 0.9%. 1000 milliLiter(s) (10 mL/Hr) IV Continuous <Continuous>  sodium chloride 0.9%. 1000 milliLiter(s) (50 mL/Hr) IV Continuous <Continuous>  sodium chloride 0.9%. 1000 milliLiter(s) (230 mL/Hr) IV Continuous <Continuous>  vancomycin  IVPB 1000 milliGRAM(s) IV Intermittent every 24 hours  vasopressin Infusion 0.03 Unit(s)/Min (4.5 mL/Hr) IV Continuous <Continuous>    MEDICATIONS  (PRN):  acetaminophen     Tablet .. 650 milliGRAM(s) Oral every 6 hours PRN Mild Pain (1 - 3)  dextrose Oral Gel 15 Gram(s) Oral once PRN Blood Glucose LESS THAN 70 milliGRAM(s)/deciliter  oxyCODONE    IR 5 milliGRAM(s) Oral every 4 hours PRN Moderate Pain (4 - 6)       PROBLEM LIST/ ASSESSMENT:  HEALTH ISSUES - PROBLEM Dx:  Chest pain    Anemia    HTN (hypertension)    Diabetes    Hypercholesterolemia    Prostate cancer    Chronic kidney disease, unspecified CKD stage        ,   Patient is a 78y old  Male who presents with a chief complaint of CAD (05 Apr 2024 22:21)     s/p OPCAB x 3      My plan includes :    Maintain MAP >65-70  trend H/H; transfuse if needed  Analgesia for acute post thoracotomy pain  strict blood sugar control      close hemodynamic, ventilatory and drain monitoring and management per post op routine    Monitor for arrhythmias and monitor parameters for organ perfusion  monitor neurologic status  Head of the bed should remain elevated to 45 deg .   chest PT and IS will be encouraged  monitor adequacy of oxygenation and ventilation and attempt to wean oxygen  Nutritional goals will be met using po eventually , ensure adequate caloric intake and montior the same  Stress ulcer and VTE prophylaxis will be achieved    Glycemic control is satisfactory  Electrolytes have been repleted as necessary and wound care has been carried out. Pain control has been achieved.   agressive physical therapy and early mobility and ambulation goals will be met   The family was updated about the course and plan  CRITICAL CARE TIME SPENT in evaluation and management, reassessments, review and interpretation of labs and x-rays, ventilator and hemodynamic management, formulating a plan and coordinating care: ___60___ MIN.  Time does not include procedural time.  CTICU ATTENDING     					    Audi Bernardo MD

## 2024-04-06 NOTE — PROGRESS NOTE ADULT - SUBJECTIVE AND OBJECTIVE BOX
CTICU  CRITICAL  CARE  attending     Hand off received 					   Pertinent clinical, laboratory, radiographic, hemodynamic, echocardiographic, respiratory data, microbiologic data and chart were reviewed and analyzed frequently throughout the course of the day and night      78 year old male with HTN, DM-II, Hypercholesterolemia, prostate Ca (s/p radiation, no current treatment), CKD.  He was evaluated for ANGINA.   He had a regular exercise stress test that was abnormal -- (Test performed by his cardiologist Dr Morton).  The patient achieved 4.5 minutes on treadmill, reached target HR of 129bpm. He had chest discomfort at 3rd minute, EKG showed PVCs, no ST-T changes. Chest pain resolved once he stopped exercise.   Shortly after, EKG showed depressed ST segments in inferolateral leads took 7 minutes to return to the baseline.   He was sent home with recommendation to return for a planned nuclear stress test the next day.   However, he had multiple episodes of chest pain, SOB after leaving the office, therefore, he came to the ER for evaluation.  LHC: Triple vessel CAD.     S/P Off pump CABG x 3.      Major HEALTH ISSUES - PROBLEM Dx:  Chest pain  Anemia  HTN (hypertension)  Diabetes Mellitis   Hypercholesterolemia  Prostate cancer  Chronic kidney disease, unspecified CKD stage        FAMILY HISTORY:  FH: CAD (coronary artery disease) (Father, Sibling)    PAST MEDICAL & SURGICAL HISTORY:  Gout  Prostate cancer  IBS (irritable bowel syndrome)  Hypertension  Diabetes Mellitis  Hypercholesterolemia  H/O arthritis  Genital herpes  Obstructive sleep apnea  Shingles  History of tonsillectomy  S/P tooth extraction  Injury of meniscus of left knee         14 system review was unremarkable    Vital signs, hemodynamic and respiratory parameters were reviewed from the bedside nursing flow sheet.  ICU Vital Signs Last 24 Hrs  T(C): 37.1 (06 Apr 2024 17:23), Max: 37.1 (06 Apr 2024 17:23)  T(F): 98.8 (06 Apr 2024 17:23), Max: 98.8 (06 Apr 2024 17:23)  HR: 107 (06 Apr 2024 21:00) (63 - 107)  BP: 131/69 (06 Apr 2024 20:00) (84/53 - 131/69)  BP(mean): 94 (06 Apr 2024 20:00) (64 - 97)  ABP: 157/63 (06 Apr 2024 21:00) (91/53 - 161/66)  ABP(mean): 98 (06 Apr 2024 21:00) (66 - 101)  RR: 18 (06 Apr 2024 21:00) (14 - 20)  SpO2: 94% (06 Apr 2024 21:00) (94% - 100%)    O2 Parameters below as of 06 Apr 2024 21:00  Patient On (Oxygen Delivery Method): room air          Adult Advanced Hemodynamics Last 24 Hrs  CVP(mm Hg): 5 (06 Apr 2024 21:00) (-6 - 305)  CVP(cm H2O): --  CO: --  CI: --  PA: --  PA(mean): --  PCWP: --  SVR: --  SVRI: --  PVR: --  PVRI: --, ABG - ( 06 Apr 2024 20:45 )  pH, Arterial: 7.40  pH, Blood: x     /  pCO2: 40    /  pO2: 75    / HCO3: 25    / Base Excess: 0.0   /  SaO2: 97.0                Intake and output was reviewed and the fluid balance was calculated  Daily     Daily   I&O's Summary    05 Apr 2024 07:01  -  06 Apr 2024 07:00  --------------------------------------------------------  IN: 985.7 mL / OUT: 1485 mL / NET: -499.3 mL    06 Apr 2024 07:01  -  06 Apr 2024 21:21  --------------------------------------------------------  IN: 1730 mL / OUT: 1135 mL / NET: 595 mL            Neuro: No postoperative motor deficit.   Neck: No JVD.  CVS: S1, S2, No S3.  Lungs: Good air entry bilaterally.   Abd: Soft. No tenderness. + Bowel sounds.  Vascular: + DP/PT.  Extremities: No edema.  Lymphatic: Normal.  Skin: No abnormalities.      labs  CBC Full  -  ( 06 Apr 2024 20:45 )  WBC Count : 8.29 K/uL  RBC Count : 2.48 M/uL  Hemoglobin : 7.8 g/dL  Hematocrit : 23.4 %  Platelet Count - Automated : 110 K/uL  Mean Cell Volume : 94.4 fl  Mean Cell Hemoglobin : 31.5 pg  Mean Cell Hemoglobin Concentration : 33.3 gm/dL  Auto Neutrophil # : 6.47 K/uL  Auto Lymphocyte # : 0.82 K/uL  Auto Monocyte # : 0.91 K/uL  Auto Eosinophil # : 0.02 K/uL  Auto Basophil # : 0.02 K/uL  Auto Neutrophil % : 78.1 %  Auto Lymphocyte % : 9.9 %  Auto Monocyte % : 11.0 %  Auto Eosinophil % : 0.2 %  Auto Basophil % : 0.2 %    04-06    134<L>  |  101  |  42<H>  ----------------------------<  234<H>  4.9   |  21<L>  |  1.98<H>    Ca    8.9      06 Apr 2024 11:36  Phos  6.4     04-06  Mg     2.2     04-06    TPro  5.5<L>  /  Alb  3.8  /  TBili  0.3  /  DBili  x   /  AST  27  /  ALT  24  /  AlkPhos  57  04-06    PT/INR - ( 06 Apr 2024 20:45 )   PT: 11.2 sec;   INR: 0.98          PTT - ( 06 Apr 2024 20:45 )  PTT:33.2 sec  The current medications were reviewed   MEDICATIONS  (STANDING):  allopurinol 300 milliGRAM(s) Oral daily  ascorbic acid 500 milliGRAM(s) Oral daily  aspirin enteric coated 81 milliGRAM(s) Oral daily  atorvastatin 40 milliGRAM(s) Oral at bedtime  buPROPion XL (24-Hour) . 150 milliGRAM(s) Oral daily  chlorhexidine 0.12% Liquid 15 milliLiter(s) Oral Mucosa every 12 hours  chlorhexidine 2% Cloths 1 Application(s) Topical daily  clopidogrel Tablet 75 milliGRAM(s) Oral daily  dexMEDEtomidine Infusion 0.2 MICROgram(s)/kG/Hr (3.43 mL/Hr) IV Continuous <Continuous>  dextrose 10% Bolus 125 milliLiter(s) IV Bolus once  dextrose 5%. 1000 milliLiter(s) (50 mL/Hr) IV Continuous <Continuous>  dextrose 5%. 1000 milliLiter(s) (100 mL/Hr) IV Continuous <Continuous>  escitalopram 20 milliGRAM(s) Oral daily  glucagon  Injectable 1 milliGRAM(s) IntraMuscular once  heparin   Injectable 5000 Unit(s) SubCutaneous every 8 hours  insulin lispro (ADMELOG) corrective regimen sliding scale   SubCutaneous Before meals and at bedtime  metoclopramide Injectable 10 milliGRAM(s) IV Push once  mupirocin 2% Nasal 1 Application(s) Both Nostrils two times a day  pantoprazole    Tablet 40 milliGRAM(s) Oral before breakfast  polyethylene glycol 3350 17 Gram(s) Oral daily  senna 2 Tablet(s) Oral at bedtime  sodium chloride 0.9%. 1000 milliLiter(s) (50 mL/Hr) IV Continuous <Continuous>  sodium chloride 0.9%. 1000 milliLiter(s) (10 mL/Hr) IV Continuous <Continuous>  sodium chloride 0.9%. 1000 milliLiter(s) (230 mL/Hr) IV Continuous <Continuous>  vancomycin  IVPB 1000 milliGRAM(s) IV Intermittent every 24 hours    MEDICATIONS  (PRN):  acetaminophen     Tablet .. 650 milliGRAM(s) Oral every 6 hours PRN Mild Pain (1 - 3)  dextrose Oral Gel 15 Gram(s) Oral once PRN Blood Glucose LESS THAN 70 milliGRAM(s)/deciliter  oxyCODONE    IR 5 milliGRAM(s) Oral every 4 hours PRN Moderate Pain (4 - 6)            78 year old  Male admitted with triple vessel CAD.  S/P Off pump CABG x 3 (LIMA to LAD and diagonal, SVG to OM).   Postoperative course complicated by posthemorrhagic anemia requiring blood transfusion.   Uncontrolled DM  Hyponatremia  Azotemia  Metabolic acidosis   Hemodynamically stable.  Good oxygenation.  Fair urine out put.        My plan includes :  OPTIMIZE Glycemic control.  Adjust Insulin.   Statin and Betablocker.  Dual antiplatelet Rx.  Anti depression Rx.  Close hemodynamic monitoring.  Monitor for arrhythmias and monitor parameters for organ perfusion  Monitor neurologic status  Monitor renal function.  Head of the bed should remain elevated to 45 deg .   Chest PT and IS will be encouraged  Monitor adequacy of oxygenation   Nutritional goals will be met using po eventually , ensure adequate caloric intake and monitor the same  Stress ulcer and VTE prophylaxis will be achieved    Electrolytes have been repleted as necessary and wound care has been carried out. Pain control has been achieved.   Aggressive physical therapy and early mobility and ambulation goals will be met   The family was updated about the course and plan  CRITICAL CARE TIME SPENT in evaluation and management, review and interpretation of labs and x-rays, hemodynamic management, formulating a plan and coordinating care: ___30____ MIN.  Time does not include procedural time.  CTICU ATTENDING     					    Milan Isaac MD

## 2024-04-06 NOTE — AIRWAY REMOVAL NOTE  ADULT & PEDS - RESPIRATORY RHYTHM/PATTERN
no shortness of breath/rate regular/depth regular/pattern regular/unlabored

## 2024-04-06 NOTE — PHYSICAL THERAPY INITIAL EVALUATION ADULT - PRECAUTIONS/LIMITATIONS, REHAB EVAL
[de-identified] : 5/26/2023: Sinus rhythm, right bundle branch block, left anterior fascicular block, nonspecific ST abnormality
sternal precautions

## 2024-04-06 NOTE — PHYSICAL THERAPY INITIAL EVALUATION ADULT - ADDITIONAL COMMENTS
Patient lives with wife in 1st floor apartment, +7 STAS. +tub shower & walk-in shower. PTA, patient was independent with all ADLs and functional activity without the use of any AD. Pt's wife notes that patient is active at baseline. Patient wears knee braces as needed.

## 2024-04-06 NOTE — PHYSICAL THERAPY INITIAL EVALUATION ADULT - MANUAL MUSCLE TESTING RESULTS, REHAB EVAL
b/l UE and LE grossly >3+/5 via functional mobility, except b/l UE flexion and horiz abd @ 3-/5 d/t adherence to sternal precautions

## 2024-04-06 NOTE — PHYSICAL THERAPY INITIAL EVALUATION ADULT - GENERAL OBSERVATIONS, REHAB EVAL
Patient encountered out of bed sitting in bedside chair in no apparent distress, +EKG +balwinder +central line +2x ilsa to wall suction +1x chest tube to wall suction +bonilla  +temporary pacemaker +SCDs. RN [place] present throughout session. Patient encountered out of bed sitting in bedside chair in no apparent distress, +EKG +balwinder +central line +2x ilsa to wall suction +1x chest tube to wall suction +bonilla  +temporary pacemaker +SCDs +b/l BARRY tafoya wrapped. LÁZARO Lovelace present throughout session.

## 2024-04-07 LAB
ALBUMIN SERPL ELPH-MCNC: 3.3 G/DL — SIGNIFICANT CHANGE UP (ref 3.3–5)
ALBUMIN SERPL ELPH-MCNC: 3.6 G/DL — SIGNIFICANT CHANGE UP (ref 3.3–5)
ALP SERPL-CCNC: 65 U/L — SIGNIFICANT CHANGE UP (ref 40–120)
ALP SERPL-CCNC: 67 U/L — SIGNIFICANT CHANGE UP (ref 40–120)
ALT FLD-CCNC: 23 U/L — SIGNIFICANT CHANGE UP (ref 10–45)
ALT FLD-CCNC: 23 U/L — SIGNIFICANT CHANGE UP (ref 10–45)
ANION GAP SERPL CALC-SCNC: 9 MMOL/L — SIGNIFICANT CHANGE UP (ref 5–17)
ANION GAP SERPL CALC-SCNC: 9 MMOL/L — SIGNIFICANT CHANGE UP (ref 5–17)
APTT BLD: 33.8 SEC — SIGNIFICANT CHANGE UP (ref 24.5–35.6)
APTT BLD: 37.1 SEC — HIGH (ref 24.5–35.6)
AST SERPL-CCNC: 24 U/L — SIGNIFICANT CHANGE UP (ref 10–40)
AST SERPL-CCNC: 25 U/L — SIGNIFICANT CHANGE UP (ref 10–40)
BASOPHILS # BLD AUTO: 0.03 K/UL — SIGNIFICANT CHANGE UP (ref 0–0.2)
BASOPHILS # BLD AUTO: 0.03 K/UL — SIGNIFICANT CHANGE UP (ref 0–0.2)
BASOPHILS NFR BLD AUTO: 0.4 % — SIGNIFICANT CHANGE UP (ref 0–2)
BASOPHILS NFR BLD AUTO: 0.4 % — SIGNIFICANT CHANGE UP (ref 0–2)
BILIRUB SERPL-MCNC: 0.2 MG/DL — SIGNIFICANT CHANGE UP (ref 0.2–1.2)
BILIRUB SERPL-MCNC: 0.4 MG/DL — SIGNIFICANT CHANGE UP (ref 0.2–1.2)
BUN SERPL-MCNC: 44 MG/DL — HIGH (ref 7–23)
BUN SERPL-MCNC: 45 MG/DL — HIGH (ref 7–23)
CALCIUM SERPL-MCNC: 8.7 MG/DL — SIGNIFICANT CHANGE UP (ref 8.4–10.5)
CALCIUM SERPL-MCNC: 8.7 MG/DL — SIGNIFICANT CHANGE UP (ref 8.4–10.5)
CHLORIDE SERPL-SCNC: 101 MMOL/L — SIGNIFICANT CHANGE UP (ref 96–108)
CHLORIDE SERPL-SCNC: 103 MMOL/L — SIGNIFICANT CHANGE UP (ref 96–108)
CO2 SERPL-SCNC: 24 MMOL/L — SIGNIFICANT CHANGE UP (ref 22–31)
CO2 SERPL-SCNC: 26 MMOL/L — SIGNIFICANT CHANGE UP (ref 22–31)
CREAT SERPL-MCNC: 2.12 MG/DL — HIGH (ref 0.5–1.3)
CREAT SERPL-MCNC: 2.37 MG/DL — HIGH (ref 0.5–1.3)
EGFR: 28 ML/MIN/1.73M2 — LOW
EGFR: 32 ML/MIN/1.73M2 — LOW
EOSINOPHIL # BLD AUTO: 0.05 K/UL — SIGNIFICANT CHANGE UP (ref 0–0.5)
EOSINOPHIL # BLD AUTO: 0.07 K/UL — SIGNIFICANT CHANGE UP (ref 0–0.5)
EOSINOPHIL NFR BLD AUTO: 0.7 % — SIGNIFICANT CHANGE UP (ref 0–6)
EOSINOPHIL NFR BLD AUTO: 0.8 % — SIGNIFICANT CHANGE UP (ref 0–6)
GAS PNL BLDA: SIGNIFICANT CHANGE UP
GAS PNL BLDA: SIGNIFICANT CHANGE UP
GLUCOSE BLDC GLUCOMTR-MCNC: 134 MG/DL — HIGH (ref 70–99)
GLUCOSE BLDC GLUCOMTR-MCNC: 154 MG/DL — HIGH (ref 70–99)
GLUCOSE BLDC GLUCOMTR-MCNC: 160 MG/DL — HIGH (ref 70–99)
GLUCOSE BLDC GLUCOMTR-MCNC: 161 MG/DL — HIGH (ref 70–99)
GLUCOSE SERPL-MCNC: 141 MG/DL — HIGH (ref 70–99)
GLUCOSE SERPL-MCNC: 155 MG/DL — HIGH (ref 70–99)
HCT VFR BLD CALC: 21.5 % — LOW (ref 39–50)
HCT VFR BLD CALC: 24.4 % — LOW (ref 39–50)
HGB BLD-MCNC: 7.1 G/DL — LOW (ref 13–17)
HGB BLD-MCNC: 8.3 G/DL — LOW (ref 13–17)
IMM GRANULOCYTES NFR BLD AUTO: 0.4 % — SIGNIFICANT CHANGE UP (ref 0–0.9)
IMM GRANULOCYTES NFR BLD AUTO: 0.5 % — SIGNIFICANT CHANGE UP (ref 0–0.9)
INR BLD: 0.94 — SIGNIFICANT CHANGE UP (ref 0.85–1.18)
INR BLD: 0.98 — SIGNIFICANT CHANGE UP (ref 0.85–1.18)
LYMPHOCYTES # BLD AUTO: 0.83 K/UL — LOW (ref 1–3.3)
LYMPHOCYTES # BLD AUTO: 1 K/UL — SIGNIFICANT CHANGE UP (ref 1–3.3)
LYMPHOCYTES # BLD AUTO: 11.1 % — LOW (ref 13–44)
LYMPHOCYTES # BLD AUTO: 11.7 % — LOW (ref 13–44)
MAGNESIUM SERPL-MCNC: 2.1 MG/DL — SIGNIFICANT CHANGE UP (ref 1.6–2.6)
MAGNESIUM SERPL-MCNC: 2.1 MG/DL — SIGNIFICANT CHANGE UP (ref 1.6–2.6)
MCHC RBC-ENTMCNC: 31.1 PG — SIGNIFICANT CHANGE UP (ref 27–34)
MCHC RBC-ENTMCNC: 31.3 PG — SIGNIFICANT CHANGE UP (ref 27–34)
MCHC RBC-ENTMCNC: 33 GM/DL — SIGNIFICANT CHANGE UP (ref 32–36)
MCHC RBC-ENTMCNC: 34 GM/DL — SIGNIFICANT CHANGE UP (ref 32–36)
MCV RBC AUTO: 91.4 FL — SIGNIFICANT CHANGE UP (ref 80–100)
MCV RBC AUTO: 94.7 FL — SIGNIFICANT CHANGE UP (ref 80–100)
MONOCYTES # BLD AUTO: 0.77 K/UL — SIGNIFICANT CHANGE UP (ref 0–0.9)
MONOCYTES # BLD AUTO: 0.91 K/UL — HIGH (ref 0–0.9)
MONOCYTES NFR BLD AUTO: 12.2 % — SIGNIFICANT CHANGE UP (ref 2–14)
MONOCYTES NFR BLD AUTO: 9 % — SIGNIFICANT CHANGE UP (ref 2–14)
NEUTROPHILS # BLD AUTO: 5.62 K/UL — SIGNIFICANT CHANGE UP (ref 1.8–7.4)
NEUTROPHILS # BLD AUTO: 6.63 K/UL — SIGNIFICANT CHANGE UP (ref 1.8–7.4)
NEUTROPHILS NFR BLD AUTO: 75.2 % — SIGNIFICANT CHANGE UP (ref 43–77)
NEUTROPHILS NFR BLD AUTO: 77.6 % — HIGH (ref 43–77)
NRBC # BLD: 0 /100 WBCS — SIGNIFICANT CHANGE UP (ref 0–0)
NRBC # BLD: 0 /100 WBCS — SIGNIFICANT CHANGE UP (ref 0–0)
PHOSPHATE SERPL-MCNC: 4.9 MG/DL — HIGH (ref 2.5–4.5)
PHOSPHATE SERPL-MCNC: 5.3 MG/DL — HIGH (ref 2.5–4.5)
PLATELET # BLD AUTO: 103 K/UL — LOW (ref 150–400)
PLATELET # BLD AUTO: 111 K/UL — LOW (ref 150–400)
POTASSIUM SERPL-MCNC: 4.8 MMOL/L — SIGNIFICANT CHANGE UP (ref 3.5–5.3)
POTASSIUM SERPL-MCNC: 4.8 MMOL/L — SIGNIFICANT CHANGE UP (ref 3.5–5.3)
POTASSIUM SERPL-SCNC: 4.8 MMOL/L — SIGNIFICANT CHANGE UP (ref 3.5–5.3)
POTASSIUM SERPL-SCNC: 4.8 MMOL/L — SIGNIFICANT CHANGE UP (ref 3.5–5.3)
PROT SERPL-MCNC: 5.1 G/DL — LOW (ref 6–8.3)
PROT SERPL-MCNC: 5.8 G/DL — LOW (ref 6–8.3)
PROTHROM AB SERPL-ACNC: 10.7 SEC — SIGNIFICANT CHANGE UP (ref 9.5–13)
PROTHROM AB SERPL-ACNC: 11.2 SEC — SIGNIFICANT CHANGE UP (ref 9.5–13)
RBC # BLD: 2.27 M/UL — LOW (ref 4.2–5.8)
RBC # BLD: 2.67 M/UL — LOW (ref 4.2–5.8)
RBC # FLD: 14.9 % — HIGH (ref 10.3–14.5)
RBC # FLD: 15.9 % — HIGH (ref 10.3–14.5)
SODIUM SERPL-SCNC: 134 MMOL/L — LOW (ref 135–145)
SODIUM SERPL-SCNC: 138 MMOL/L — SIGNIFICANT CHANGE UP (ref 135–145)
WBC # BLD: 7.47 K/UL — SIGNIFICANT CHANGE UP (ref 3.8–10.5)
WBC # BLD: 8.54 K/UL — SIGNIFICANT CHANGE UP (ref 3.8–10.5)
WBC # FLD AUTO: 7.47 K/UL — SIGNIFICANT CHANGE UP (ref 3.8–10.5)
WBC # FLD AUTO: 8.54 K/UL — SIGNIFICANT CHANGE UP (ref 3.8–10.5)

## 2024-04-07 PROCEDURE — 99291 CRITICAL CARE FIRST HOUR: CPT

## 2024-04-07 PROCEDURE — 71045 X-RAY EXAM CHEST 1 VIEW: CPT | Mod: 26

## 2024-04-07 PROCEDURE — 71045 X-RAY EXAM CHEST 1 VIEW: CPT | Mod: 26,77

## 2024-04-07 RX ORDER — METOCLOPRAMIDE HCL 10 MG
10 TABLET ORAL ONCE
Refills: 0 | Status: DISCONTINUED | OUTPATIENT
Start: 2024-04-07 | End: 2024-04-08

## 2024-04-07 RX ORDER — METOPROLOL TARTRATE 50 MG
25 TABLET ORAL EVERY 8 HOURS
Refills: 0 | Status: DISCONTINUED | OUTPATIENT
Start: 2024-04-07 | End: 2024-04-08

## 2024-04-07 RX ADMIN — OXYCODONE HYDROCHLORIDE 5 MILLIGRAM(S): 5 TABLET ORAL at 23:00

## 2024-04-07 RX ADMIN — Medication 81 MILLIGRAM(S): at 12:09

## 2024-04-07 RX ADMIN — OXYCODONE HYDROCHLORIDE 5 MILLIGRAM(S): 5 TABLET ORAL at 09:57

## 2024-04-07 RX ADMIN — ATORVASTATIN CALCIUM 40 MILLIGRAM(S): 80 TABLET, FILM COATED ORAL at 22:03

## 2024-04-07 RX ADMIN — MUPIROCIN 1 APPLICATION(S): 20 OINTMENT TOPICAL at 17:17

## 2024-04-07 RX ADMIN — CHLORHEXIDINE GLUCONATE 15 MILLILITER(S): 213 SOLUTION TOPICAL at 07:18

## 2024-04-07 RX ADMIN — OXYCODONE HYDROCHLORIDE 5 MILLIGRAM(S): 5 TABLET ORAL at 04:05

## 2024-04-07 RX ADMIN — OXYCODONE HYDROCHLORIDE 5 MILLIGRAM(S): 5 TABLET ORAL at 05:00

## 2024-04-07 RX ADMIN — Medication 25 MILLIGRAM(S): at 17:13

## 2024-04-07 RX ADMIN — Medication 250 MILLIGRAM(S): at 09:57

## 2024-04-07 RX ADMIN — HEPARIN SODIUM 5000 UNIT(S): 5000 INJECTION INTRAVENOUS; SUBCUTANEOUS at 07:05

## 2024-04-07 RX ADMIN — OXYCODONE HYDROCHLORIDE 5 MILLIGRAM(S): 5 TABLET ORAL at 01:00

## 2024-04-07 RX ADMIN — HEPARIN SODIUM 5000 UNIT(S): 5000 INJECTION INTRAVENOUS; SUBCUTANEOUS at 22:04

## 2024-04-07 RX ADMIN — OXYCODONE HYDROCHLORIDE 5 MILLIGRAM(S): 5 TABLET ORAL at 19:00

## 2024-04-07 RX ADMIN — MUPIROCIN 1 APPLICATION(S): 20 OINTMENT TOPICAL at 07:19

## 2024-04-07 RX ADMIN — Medication 300 MILLIGRAM(S): at 12:09

## 2024-04-07 RX ADMIN — CLOPIDOGREL BISULFATE 75 MILLIGRAM(S): 75 TABLET, FILM COATED ORAL at 12:10

## 2024-04-07 RX ADMIN — Medication 25 MILLIGRAM(S): at 23:00

## 2024-04-07 RX ADMIN — OXYCODONE HYDROCHLORIDE 5 MILLIGRAM(S): 5 TABLET ORAL at 18:32

## 2024-04-07 RX ADMIN — Medication 2: at 11:42

## 2024-04-07 RX ADMIN — ESCITALOPRAM OXALATE 20 MILLIGRAM(S): 10 TABLET, FILM COATED ORAL at 12:09

## 2024-04-07 RX ADMIN — OXYCODONE HYDROCHLORIDE 5 MILLIGRAM(S): 5 TABLET ORAL at 10:41

## 2024-04-07 RX ADMIN — BUPROPION HYDROCHLORIDE 150 MILLIGRAM(S): 150 TABLET, EXTENDED RELEASE ORAL at 12:09

## 2024-04-07 RX ADMIN — Medication 500 MILLIGRAM(S): at 12:09

## 2024-04-07 RX ADMIN — CHLORHEXIDINE GLUCONATE 1 APPLICATION(S): 213 SOLUTION TOPICAL at 06:00

## 2024-04-07 RX ADMIN — Medication 25 MILLIGRAM(S): at 07:05

## 2024-04-07 RX ADMIN — POLYETHYLENE GLYCOL 3350 17 GRAM(S): 17 POWDER, FOR SOLUTION ORAL at 12:08

## 2024-04-07 RX ADMIN — Medication 650 MILLIGRAM(S): at 01:00

## 2024-04-07 RX ADMIN — Medication 2: at 07:18

## 2024-04-07 RX ADMIN — Medication 2: at 16:38

## 2024-04-07 RX ADMIN — PANTOPRAZOLE SODIUM 40 MILLIGRAM(S): 20 TABLET, DELAYED RELEASE ORAL at 07:05

## 2024-04-07 RX ADMIN — HEPARIN SODIUM 5000 UNIT(S): 5000 INJECTION INTRAVENOUS; SUBCUTANEOUS at 14:13

## 2024-04-07 NOTE — CHART NOTE - NSCHARTNOTEFT_GEN_A_CORE
Plerual CT removed, no air leak prior to removal. Minimal drainage. Tie down and occlusive dressing in place. Pt tolerated well. CXR ordered

## 2024-04-07 NOTE — PROGRESS NOTE ADULT - SUBJECTIVE AND OBJECTIVE BOX
CTICU  CRITICAL  CARE  attending     Hand off received 					   Pertinent clinical, laboratory, radiographic, hemodynamic, echocardiographic, respiratory data, microbiologic data and chart were reviewed and analyzed frequently throughout the course of the day and night  Patient seen and examined with CTS/ SH attending at bedside    Pt is a 78y , Male, post op day # 2 s/p CABG x 3; off pump; post op EF nl    post op:    remains extubated  acute post Hemorrhagic anemia  s/p 1 unit PRBC  borderline BP ( systolic 's)  Stable Cr ( hx of CKD    79 y/o male with HTN, DM-II, Hypercholesterolemia, prostate Ca (s/p radiation, no current treatment), CKD, presents to the [facility] ER with chest pain that started over last 4-5 weeks. Pain initially started while walking in [place], relieved with rest. Since then, he has been experiencing pain at rest and minimal exertion, increased in frequency and associated with SOB. Pain is rated 7-8/10, lasting a few minutes. He had an episode of nausea and vomiting day before admission.   He had a stress test on day of admission that was abnormal -- as per the cardiologist Dr Morton, pt achieved 4.5 mins on treadmill, reached target HR of 129bpm. He had chest discomfort at 3rd minute, EKG showed PVCs, no ST-T changes. Chest pain resolved once he stopped exercise. Shortly after, EKG showed slurred ST segments in inferolateral leads that took 7 mins to resolve. He was sent home with recommendation to return for a planned nuclear stress test the next day. However, he had multiple episodes of chest pain, SOB after leaving the office, therefore, he came to the ER for evaluation    Chest pain    Anemia    HTN (hypertension)    Diabetes    Hypercholesterolemia    Prostate cancer    Chronic kidney disease, unspecified CKD stage        , FAMILY HISTORY:  FH: CAD (coronary artery disease) (Father, Sibling)    PAST MEDICAL & SURGICAL HISTORY:  Gout      Prostate cancer      IBS (irritable bowel syndrome)      Hypertension      Diabetes      Hypercholesterolemia      H/O arthritis      Genital herpes      Obstructive sleep apnea      Shingles      History of tonsillectomy      S/P tooth extraction      Injury of meniscus of left knee        Patient is a 78y old  Male who presents with a chief complaint of CAD (06 Apr 2024 21:20)      14 system review limited 2/2 post op morbidity    Vital signs, hemodynamic and respiratory parameters were reviewed from the bedside nursing flowsheet.  ICU Vital Signs Last 24 Hrs  T(C): 37.1 (07 Apr 2024 13:48), Max: 37.6 (06 Apr 2024 21:30)  T(F): 98.8 (07 Apr 2024 13:48), Max: 99.7 (06 Apr 2024 21:30)  HR: 98 (07 Apr 2024 15:00) (84 - 111)  BP: 96/55 (07 Apr 2024 05:00) (96/55 - 131/69)  BP(mean): 73 (07 Apr 2024 05:00) (73 - 97)  ABP: 135/64 (07 Apr 2024 15:00) (89/63 - 161/66)  ABP(mean): 70 (07 Apr 2024 15:00) (65 - 101)  RR: 17 (07 Apr 2024 15:00) (16 - 20)  SpO2: 91% (07 Apr 2024 15:00) (91% - 96%)    O2 Parameters below as of 07 Apr 2024 15:00  Patient On (Oxygen Delivery Method): room air          Adult Advanced Hemodynamics Last 24 Hrs  CVP(mm Hg): 2 (07 Apr 2024 14:00) (-1 - 303)  CVP(cm H2O): --  CO: --  CI: --  PA: --  PA(mean): --  PCWP: --  SVR: --  SVRI: --  PVR: --  PVRI: --, ABG - ( 07 Apr 2024 12:02 )  pH, Arterial: 7.37  pH, Blood: x     /  pCO2: 43    /  pO2: 75    / HCO3: 25    / Base Excess: -0.6  /  SaO2: 96.9                Intake and output was reviewed and the fluid balance was calculated  Daily     Daily   I&O's Summary    06 Apr 2024 07:01  -  07 Apr 2024 07:00  --------------------------------------------------------  IN: 2140 mL / OUT: 2860 mL / NET: -720 mL    07 Apr 2024 07:01  -  07 Apr 2024 15:42  --------------------------------------------------------  IN: 1530 mL / OUT: 1335 mL / NET: 195 mL        All lines and drain sites were assessed  [facility]      POCT Blood Glucose.: 160 mg/dL (07 Apr 2024 11:38)    No acute change in mental status  Auscultation of the chest reveals equal bs  Abdomen is soft  Extremities are warm and well perfused  Wounds appear clean and unremarkable  Antibiotics are periop    labs  CBC Full  -  ( 07 Apr 2024 12:01 )  WBC Count : 8.54 K/uL  RBC Count : 2.67 M/uL  Hemoglobin : 8.3 g/dL  Hematocrit : 24.4 %  Platelet Count - Automated : 111 K/uL  Mean Cell Volume : 91.4 fl  Mean Cell Hemoglobin : 31.1 pg  Mean Cell Hemoglobin Concentration : 34.0 gm/dL  Auto Neutrophil # : 6.63 K/uL  Auto Lymphocyte # : 1.00 K/uL  Auto Monocyte # : 0.77 K/uL  Auto Eosinophil # : 0.07 K/uL  Auto Basophil # : 0.03 K/uL  Auto Neutrophil % : 77.6 %  Auto Lymphocyte % : 11.7 %  Auto Monocyte % : 9.0 %  Auto Eosinophil % : 0.8 %  Auto Basophil % : 0.4 %    04-07    134<L>  |  101  |  44<H>  ----------------------------<  141<H>  4.8   |  24  |  2.12<H>    Ca    8.7      07 Apr 2024 12:01  Phos  4.9     04-07  Mg     2.1     04-07    TPro  5.8<L>  /  Alb  3.6  /  TBili  0.4  /  DBili  x   /  AST  24  /  ALT  23  /  AlkPhos  67  04-07    PT/INR - ( 07 Apr 2024 12:01 )   PT: 10.7 sec;   INR: 0.94          PTT - ( 07 Apr 2024 12:01 )  PTT:33.8 sec  The current medications were reviewed   MEDICATIONS  (STANDING):  allopurinol 300 milliGRAM(s) Oral daily  ascorbic acid 500 milliGRAM(s) Oral daily  aspirin enteric coated 81 milliGRAM(s) Oral daily  atorvastatin 40 milliGRAM(s) Oral at bedtime  bisacodyl Suppository 10 milliGRAM(s) Rectal once  buPROPion XL (24-Hour) . 150 milliGRAM(s) Oral daily  chlorhexidine 0.12% Liquid 15 milliLiter(s) Oral Mucosa every 12 hours  chlorhexidine 2% Cloths 1 Application(s) Topical daily  clopidogrel Tablet 75 milliGRAM(s) Oral daily  dexMEDEtomidine Infusion 0.2 MICROgram(s)/kG/Hr (3.43 mL/Hr) IV Continuous <Continuous>  dextrose 10% Bolus 125 milliLiter(s) IV Bolus once  dextrose 5%. 1000 milliLiter(s) (50 mL/Hr) IV Continuous <Continuous>  dextrose 5%. 1000 milliLiter(s) (100 mL/Hr) IV Continuous <Continuous>  escitalopram 20 milliGRAM(s) Oral daily  glucagon  Injectable 1 milliGRAM(s) IntraMuscular once  heparin   Injectable 5000 Unit(s) SubCutaneous every 8 hours  insulin lispro (ADMELOG) corrective regimen sliding scale   SubCutaneous Before meals and at bedtime  metoclopramide Injectable 10 milliGRAM(s) IV Push once  metoclopramide Injectable 10 milliGRAM(s) IV Push once  metoprolol tartrate 25 milliGRAM(s) Oral two times a day  mupirocin 2% Nasal 1 Application(s) Both Nostrils two times a day  pantoprazole    Tablet 40 milliGRAM(s) Oral before breakfast  polyethylene glycol 3350 17 Gram(s) Oral daily  senna 2 Tablet(s) Oral at bedtime  sodium chloride 0.9%. 1000 milliLiter(s) (10 mL/Hr) IV Continuous <Continuous>  sodium chloride 0.9%. 1000 milliLiter(s) (230 mL/Hr) IV Continuous <Continuous>  sodium chloride 0.9%. 1000 milliLiter(s) (50 mL/Hr) IV Continuous <Continuous>  vancomycin  IVPB 1000 milliGRAM(s) IV Intermittent every 24 hours    MEDICATIONS  (PRN):  acetaminophen     Tablet .. 650 milliGRAM(s) Oral every 6 hours PRN Mild Pain (1 - 3)  dextrose Oral Gel 15 Gram(s) Oral once PRN Blood Glucose LESS THAN 70 milliGRAM(s)/deciliter  oxyCODONE    IR 5 milliGRAM(s) Oral every 4 hours PRN Moderate Pain (4 - 6)       PROBLEM LIST/ ASSESSMENT:  HEALTH ISSUES - PROBLEM Dx:  Chest pain    Anemia    HTN (hypertension)    Diabetes    Hypercholesterolemia    Prostate cancer    Chronic kidney disease, unspecified CKD stage        ,   Patient is a 78y old  Male who presents with a chief complaint of CAD (06 Apr 2024 21:20)     s/p OPCAB x 3V      My plan includes :    Maintain MAP >70 ( slightly higher perfusion pressure for CKD)  trend H/H; transfuse if needed  Analgesia for acute post thoracotomy pain  strict blood sugar control    close hemodynamic, ventilatory and drain monitoring and management per post op routine    Monitor for arrhythmias and monitor parameters for organ perfusion  monitor neurologic status  Head of the bed should remain elevated to 45 deg .   chest PT and IS will be encouraged  monitor adequacy of oxygenation and ventilation and attempt to wean oxygen  Nutritional goals will be met using po eventually , ensure adequate caloric intake and montior the same  Stress ulcer and VTE prophylaxis will be achieved    Glycemic control is satisfactory  Electrolytes have been repleted as necessary and wound care has been carried out. Pain control has been achieved.   agressive physical therapy and early mobility and ambulation goals will be met   The family was updated about the course and plan  CRITICAL CARE TIME SPENT in evaluation and management, reassessments, review and interpretation of labs and x-rays, ventilator and hemodynamic management, formulating a plan and coordinating care: ___60____ MIN.  Time does not include procedural time.  CTICU ATTENDING     					    Audi Bernardo MD

## 2024-04-07 NOTE — PROGRESS NOTE ADULT - SUBJECTIVE AND OBJECTIVE BOX
CTICU  CRITICAL  CARE  attending     Hand off received 					   Pertinent clinical, laboratory, radiographic, hemodynamic, echocardiographic, respiratory data, microbiologic data and chart were reviewed and analyzed frequently throughout the course of the day and night        78 year old male with HTN, DM-II, Hypercholesterolemia, prostate Ca (s/p radiation, no current treatment), CKD.  He was evaluated for ANGINA.   He had a regular exercise stress test that was abnormal -- (Test performed by his cardiologist Dr Morton).  The patient achieved 4.5 minutes on treadmill, reached target HR of 129bpm. He had chest discomfort at 3rd minute, EKG showed PVCs, no ST-T changes. Chest pain resolved once he stopped exercise.   Shortly after, EKG showed depressed ST segments in inferolateral leads took 7 minutes to return to the baseline.   He was sent home with recommendation to return for a planned nuclear stress test the next day.   However, he had multiple episodes of chest pain, SOB after leaving the office, therefore, he came to the ER for evaluation.  LHC: Triple vessel CAD.     S/P Off pump CABG x 3.          HEALTH ISSUES - PROBLEM Dx:  Chest pain  Anemia  HTN (hypertension)  Diabetes Mellitis  Hypercholesterolemia  Prostate cancer  Chronic kidney disease, unspecified CKD stage        FAMILY HISTORY:  FH: CAD (coronary artery disease) (Father, Sibling)    PAST MEDICAL & SURGICAL HISTORY:  Gout  Prostate cancer  IBS (irritable bowel syndrome)  Hypertension  Diabetes Mellitis  Hypercholesterolemia  H/O arthritis  Genital herpes  Obstructive sleep apnea  Shingles  History of tonsillectomy  S/P tooth extraction  Injury of meniscus of left knee          14 system review was unremarkable      Vital signs, hemodynamic and respiratory parameters were reviewed from the bedside nursing flow sheet.  ICU Vital Signs Last 24 Hrs  T(C): 37.5 (07 Apr 2024 17:11), Max: 37.6 (06 Apr 2024 21:30)  T(F): 99.5 (07 Apr 2024 17:11), Max: 99.7 (06 Apr 2024 21:30)  HR: 101 (07 Apr 2024 20:00) (84 - 111)  BP: 103/62 (07 Apr 2024 20:00) (96/55 - 124/66)  BP(mean): 77 (07 Apr 2024 20:00) (73 - 89)  ABP: 139/66 (07 Apr 2024 17:00) (89/63 - 161/64)  ABP(mean): 90 (07 Apr 2024 17:00) (65 - 100)  RR: 20 (07 Apr 2024 21:00) (16 - 20)  SpO2: 93% (07 Apr 2024 20:00) (91% - 95%)    O2 Parameters below as of 07 Apr 2024 21:00  Patient On (Oxygen Delivery Method): room air          Adult Advanced Hemodynamics Last 24 Hrs  CVP(mm Hg): 2 (07 Apr 2024 14:00) (-1 - 7)  CVP(cm H2O): --  CO: --  CI: --  PA: --  PA(mean): --  PCWP: --  SVR: --  SVRI: --  PVR: --  PVRI: --, ABG - ( 07 Apr 2024 12:02 )  pH, Arterial: 7.37  pH, Blood: x     /  pCO2: 43    /  pO2: 75    / HCO3: 25    / Base Excess: -0.6  /  SaO2: 96.9                Intake and output was reviewed and the fluid balance was calculated  Daily     Daily   I&O's Summary    06 Apr 2024 07:01  -  07 Apr 2024 07:00  --------------------------------------------------------  IN: 2140 mL / OUT: 2860 mL / NET: -720 mL    07 Apr 2024 07:01  -  07 Apr 2024 21:05  --------------------------------------------------------  IN: 2250 mL / OUT: 2035 mL / NET: 215 mL          Neuro: No change in the Neuro status from the baseline.   Neck: No JVD.  CVS: S1, S2, No S3.  Lungs: Good air entry bilaterally.  Abd: Soft. No tenderness. + Bowel sounds.  Vascular: + DP/PT.  Extremities: No edema.  Lymphatic: Normal.  Skin: No abnormalities.      labs  CBC Full  -  ( 07 Apr 2024 12:01 )  WBC Count : 8.54 K/uL  RBC Count : 2.67 M/uL  Hemoglobin : 8.3 g/dL  Hematocrit : 24.4 %  Platelet Count - Automated : 111 K/uL  Mean Cell Volume : 91.4 fl  Mean Cell Hemoglobin : 31.1 pg  Mean Cell Hemoglobin Concentration : 34.0 gm/dL  Auto Neutrophil # : 6.63 K/uL  Auto Lymphocyte # : 1.00 K/uL  Auto Monocyte # : 0.77 K/uL  Auto Eosinophil # : 0.07 K/uL  Auto Basophil # : 0.03 K/uL  Auto Neutrophil % : 77.6 %  Auto Lymphocyte % : 11.7 %  Auto Monocyte % : 9.0 %  Auto Eosinophil % : 0.8 %  Auto Basophil % : 0.4 %    04-07    134<L>  |  101  |  44<H>  ----------------------------<  141<H>  4.8   |  24  |  2.12<H>    Ca    8.7      07 Apr 2024 12:01  Phos  4.9     04-07  Mg     2.1     04-07    TPro  5.8<L>  /  Alb  3.6  /  TBili  0.4  /  DBili  x   /  AST  24  /  ALT  23  /  AlkPhos  67  04-07    PT/INR - ( 07 Apr 2024 12:01 )   PT: 10.7 sec;   INR: 0.94          PTT - ( 07 Apr 2024 12:01 )  PTT:33.8 sec  The current medications were reviewed   MEDICATIONS  (STANDING):  allopurinol 300 milliGRAM(s) Oral daily  ascorbic acid 500 milliGRAM(s) Oral daily  aspirin enteric coated 81 milliGRAM(s) Oral daily  atorvastatin 40 milliGRAM(s) Oral at bedtime  bisacodyl Suppository 10 milliGRAM(s) Rectal once  buPROPion XL (24-Hour) . 150 milliGRAM(s) Oral daily  chlorhexidine 0.12% Liquid 15 milliLiter(s) Oral Mucosa every 12 hours  chlorhexidine 2% Cloths 1 Application(s) Topical daily  clopidogrel Tablet 75 milliGRAM(s) Oral daily  dexMEDEtomidine Infusion 0.2 MICROgram(s)/kG/Hr (3.43 mL/Hr) IV Continuous <Continuous>  dextrose 10% Bolus 125 milliLiter(s) IV Bolus once  dextrose 5%. 1000 milliLiter(s) (50 mL/Hr) IV Continuous <Continuous>  dextrose 5%. 1000 milliLiter(s) (100 mL/Hr) IV Continuous <Continuous>  escitalopram 20 milliGRAM(s) Oral daily  glucagon  Injectable 1 milliGRAM(s) IntraMuscular once  heparin   Injectable 5000 Unit(s) SubCutaneous every 8 hours  insulin lispro (ADMELOG) corrective regimen sliding scale   SubCutaneous Before meals and at bedtime  metoclopramide Injectable 10 milliGRAM(s) IV Push once  metoclopramide Injectable 10 milliGRAM(s) IV Push once  metoprolol tartrate 25 milliGRAM(s) Oral every 8 hours  mupirocin 2% Nasal 1 Application(s) Both Nostrils two times a day  pantoprazole    Tablet 40 milliGRAM(s) Oral before breakfast  polyethylene glycol 3350 17 Gram(s) Oral daily  senna 2 Tablet(s) Oral at bedtime  sodium chloride 0.9%. 1000 milliLiter(s) (50 mL/Hr) IV Continuous <Continuous>  sodium chloride 0.9%. 1000 milliLiter(s) (10 mL/Hr) IV Continuous <Continuous>  sodium chloride 0.9%. 1000 milliLiter(s) (230 mL/Hr) IV Continuous <Continuous>  vancomycin  IVPB 1000 milliGRAM(s) IV Intermittent every 24 hours    MEDICATIONS  (PRN):  acetaminophen     Tablet .. 650 milliGRAM(s) Oral every 6 hours PRN Mild Pain (1 - 3)  dextrose Oral Gel 15 Gram(s) Oral once PRN Blood Glucose LESS THAN 70 milliGRAM(s)/deciliter  oxyCODONE    IR 5 milliGRAM(s) Oral every 4 hours PRN Moderate Pain (4 - 6)        PROBLEM LIST/ ASSESSMENT:  HEALTH ISSUES - PROBLEM Dx:  Chest pain  Anemia  HTN (hypertension)  Diabetes Mellitis  Hypercholesterolemia  Prostate cancer  Chronic kidney disease        78 year old  Male admitted with triple vessel CAD.  S/P Off pump CABG x 3 (LIMA to LAD and diagonal, SVG to OM).   Postoperative course complicated by posthemorrhagic anemia requiring blood transfusion.   MARLEN  Hemodynamically stable.  Good oxygenation.  Fair urine out put.        My plan includes :  Statin and Betablocker.  Dual antiplatelet Rx.  Close hemodynamic, ventilatory and drain monitoring and management  Monitor for arrhythmias and monitor parameters for organ perfusion  Monitor neurologic status  Monitor renal function.  Head of the bed should remain elevated to 45 deg .   Chest PT and IS will be encouraged  Monitor adequacy of oxygenation and ventilation and attempt to wean oxygen  Nutritional goals will be met using po eventually , ensure adequate caloric intake and monitor the same  Stress ulcer and VTE prophylaxis will be achieved    Glycemic control is satisfactory  Electrolytes have been repleted as necessary and wound care has been carried out. Pain control has been achieved.   Aggressive physical therapy and early mobility and ambulation goals will be met   The family was updated about the course and plan  CRITICAL CARE TIME SPENT in evaluation and management, reassessments, review and interpretation of labs and x-rays, hemodynamic management, formulating a plan and coordinating care: ___30____ MIN.  Time does not include procedural time.  CTICU ATTENDING     					    Milan Isaac MD

## 2024-04-08 ENCOUNTER — RESULT REVIEW (OUTPATIENT)
Age: 79
End: 2024-04-08

## 2024-04-08 LAB
ALBUMIN SERPL ELPH-MCNC: 3.4 G/DL — SIGNIFICANT CHANGE UP (ref 3.3–5)
ALBUMIN SERPL ELPH-MCNC: 3.5 G/DL — SIGNIFICANT CHANGE UP (ref 3.3–5)
ALP SERPL-CCNC: 74 U/L — SIGNIFICANT CHANGE UP (ref 40–120)
ALP SERPL-CCNC: 89 U/L — SIGNIFICANT CHANGE UP (ref 40–120)
ALT FLD-CCNC: 18 U/L — SIGNIFICANT CHANGE UP (ref 10–45)
ALT FLD-CCNC: 20 U/L — SIGNIFICANT CHANGE UP (ref 10–45)
ANION GAP SERPL CALC-SCNC: 11 MMOL/L — SIGNIFICANT CHANGE UP (ref 5–17)
ANION GAP SERPL CALC-SCNC: 11 MMOL/L — SIGNIFICANT CHANGE UP (ref 5–17)
APTT BLD: 43.5 SEC — HIGH (ref 24.5–35.6)
APTT BLD: 46.9 SEC — HIGH (ref 24.5–35.6)
AST SERPL-CCNC: 19 U/L — SIGNIFICANT CHANGE UP (ref 10–40)
AST SERPL-CCNC: 20 U/L — SIGNIFICANT CHANGE UP (ref 10–40)
BASOPHILS # BLD AUTO: 0.01 K/UL — SIGNIFICANT CHANGE UP (ref 0–0.2)
BASOPHILS # BLD AUTO: 0.02 K/UL — SIGNIFICANT CHANGE UP (ref 0–0.2)
BASOPHILS NFR BLD AUTO: 0.1 % — SIGNIFICANT CHANGE UP (ref 0–2)
BASOPHILS NFR BLD AUTO: 0.3 % — SIGNIFICANT CHANGE UP (ref 0–2)
BILIRUB SERPL-MCNC: 0.3 MG/DL — SIGNIFICANT CHANGE UP (ref 0.2–1.2)
BILIRUB SERPL-MCNC: 1.3 MG/DL — HIGH (ref 0.2–1.2)
BLD GP AB SCN SERPL QL: NEGATIVE — SIGNIFICANT CHANGE UP
BUN SERPL-MCNC: 41 MG/DL — HIGH (ref 7–23)
BUN SERPL-MCNC: 43 MG/DL — HIGH (ref 7–23)
CALCIUM SERPL-MCNC: 8.4 MG/DL — SIGNIFICANT CHANGE UP (ref 8.4–10.5)
CALCIUM SERPL-MCNC: 9 MG/DL — SIGNIFICANT CHANGE UP (ref 8.4–10.5)
CHLORIDE SERPL-SCNC: 101 MMOL/L — SIGNIFICANT CHANGE UP (ref 96–108)
CHLORIDE SERPL-SCNC: 104 MMOL/L — SIGNIFICANT CHANGE UP (ref 96–108)
CO2 SERPL-SCNC: 24 MMOL/L — SIGNIFICANT CHANGE UP (ref 22–31)
CO2 SERPL-SCNC: 26 MMOL/L — SIGNIFICANT CHANGE UP (ref 22–31)
CREAT SERPL-MCNC: 2.1 MG/DL — HIGH (ref 0.5–1.3)
CREAT SERPL-MCNC: 2.39 MG/DL — HIGH (ref 0.5–1.3)
EGFR: 27 ML/MIN/1.73M2 — LOW
EGFR: 32 ML/MIN/1.73M2 — LOW
EOSINOPHIL # BLD AUTO: 0.08 K/UL — SIGNIFICANT CHANGE UP (ref 0–0.5)
EOSINOPHIL # BLD AUTO: 0.12 K/UL — SIGNIFICANT CHANGE UP (ref 0–0.5)
EOSINOPHIL NFR BLD AUTO: 1 % — SIGNIFICANT CHANGE UP (ref 0–6)
EOSINOPHIL NFR BLD AUTO: 1.6 % — SIGNIFICANT CHANGE UP (ref 0–6)
GLUCOSE BLDC GLUCOMTR-MCNC: 115 MG/DL — HIGH (ref 70–99)
GLUCOSE BLDC GLUCOMTR-MCNC: 132 MG/DL — HIGH (ref 70–99)
GLUCOSE BLDC GLUCOMTR-MCNC: 134 MG/DL — HIGH (ref 70–99)
GLUCOSE BLDC GLUCOMTR-MCNC: 152 MG/DL — HIGH (ref 70–99)
GLUCOSE SERPL-MCNC: 107 MG/DL — HIGH (ref 70–99)
GLUCOSE SERPL-MCNC: 136 MG/DL — HIGH (ref 70–99)
HCT VFR BLD CALC: 20.7 % — CRITICAL LOW (ref 39–50)
HCT VFR BLD CALC: 21.5 % — LOW (ref 39–50)
HCT VFR BLD CALC: 32 % — LOW (ref 39–50)
HGB BLD-MCNC: 10.7 G/DL — LOW (ref 13–17)
HGB BLD-MCNC: 6.9 G/DL — CRITICAL LOW (ref 13–17)
HGB BLD-MCNC: 6.9 G/DL — CRITICAL LOW (ref 13–17)
IMM GRANULOCYTES NFR BLD AUTO: 0.7 % — SIGNIFICANT CHANGE UP (ref 0–0.9)
IMM GRANULOCYTES NFR BLD AUTO: 0.8 % — SIGNIFICANT CHANGE UP (ref 0–0.9)
INR BLD: 0.87 — SIGNIFICANT CHANGE UP (ref 0.85–1.18)
INR BLD: 0.96 — SIGNIFICANT CHANGE UP (ref 0.85–1.18)
LACTATE SERPL-SCNC: 0.6 MMOL/L — SIGNIFICANT CHANGE UP (ref 0.5–2)
LYMPHOCYTES # BLD AUTO: 0.77 K/UL — LOW (ref 1–3.3)
LYMPHOCYTES # BLD AUTO: 0.92 K/UL — LOW (ref 1–3.3)
LYMPHOCYTES # BLD AUTO: 10 % — LOW (ref 13–44)
LYMPHOCYTES # BLD AUTO: 12.5 % — LOW (ref 13–44)
MAGNESIUM SERPL-MCNC: 2 MG/DL — SIGNIFICANT CHANGE UP (ref 1.6–2.6)
MAGNESIUM SERPL-MCNC: 2.1 MG/DL — SIGNIFICANT CHANGE UP (ref 1.6–2.6)
MCHC RBC-ENTMCNC: 30.1 PG — SIGNIFICANT CHANGE UP (ref 27–34)
MCHC RBC-ENTMCNC: 30.9 PG — SIGNIFICANT CHANGE UP (ref 27–34)
MCHC RBC-ENTMCNC: 31.2 PG — SIGNIFICANT CHANGE UP (ref 27–34)
MCHC RBC-ENTMCNC: 32.1 GM/DL — SIGNIFICANT CHANGE UP (ref 32–36)
MCHC RBC-ENTMCNC: 33.3 GM/DL — SIGNIFICANT CHANGE UP (ref 32–36)
MCHC RBC-ENTMCNC: 33.4 GM/DL — SIGNIFICANT CHANGE UP (ref 32–36)
MCV RBC AUTO: 92.5 FL — SIGNIFICANT CHANGE UP (ref 80–100)
MCV RBC AUTO: 93.7 FL — SIGNIFICANT CHANGE UP (ref 80–100)
MCV RBC AUTO: 93.9 FL — SIGNIFICANT CHANGE UP (ref 80–100)
MONOCYTES # BLD AUTO: 0.64 K/UL — SIGNIFICANT CHANGE UP (ref 0–0.9)
MONOCYTES # BLD AUTO: 0.79 K/UL — SIGNIFICANT CHANGE UP (ref 0–0.9)
MONOCYTES NFR BLD AUTO: 10.3 % — SIGNIFICANT CHANGE UP (ref 2–14)
MONOCYTES NFR BLD AUTO: 8.7 % — SIGNIFICANT CHANGE UP (ref 2–14)
NEUTROPHILS # BLD AUTO: 5.61 K/UL — SIGNIFICANT CHANGE UP (ref 1.8–7.4)
NEUTROPHILS # BLD AUTO: 5.98 K/UL — SIGNIFICANT CHANGE UP (ref 1.8–7.4)
NEUTROPHILS NFR BLD AUTO: 76.2 % — SIGNIFICANT CHANGE UP (ref 43–77)
NEUTROPHILS NFR BLD AUTO: 77.8 % — HIGH (ref 43–77)
NRBC # BLD: 0 /100 WBCS — SIGNIFICANT CHANGE UP (ref 0–0)
PHOSPHATE SERPL-MCNC: 4.3 MG/DL — SIGNIFICANT CHANGE UP (ref 2.5–4.5)
PHOSPHATE SERPL-MCNC: 4.4 MG/DL — SIGNIFICANT CHANGE UP (ref 2.5–4.5)
PLATELET # BLD AUTO: 107 K/UL — LOW (ref 150–400)
PLATELET # BLD AUTO: 96 K/UL — LOW (ref 150–400)
PLATELET # BLD AUTO: 97 K/UL — LOW (ref 150–400)
POTASSIUM SERPL-MCNC: 4.4 MMOL/L — SIGNIFICANT CHANGE UP (ref 3.5–5.3)
POTASSIUM SERPL-MCNC: 4.5 MMOL/L — SIGNIFICANT CHANGE UP (ref 3.5–5.3)
POTASSIUM SERPL-SCNC: 4.4 MMOL/L — SIGNIFICANT CHANGE UP (ref 3.5–5.3)
POTASSIUM SERPL-SCNC: 4.5 MMOL/L — SIGNIFICANT CHANGE UP (ref 3.5–5.3)
PROT SERPL-MCNC: 5.1 G/DL — LOW (ref 6–8.3)
PROT SERPL-MCNC: 5.9 G/DL — LOW (ref 6–8.3)
PROTHROM AB SERPL-ACNC: 10 SEC — SIGNIFICANT CHANGE UP (ref 9.5–13)
PROTHROM AB SERPL-ACNC: 11 SEC — SIGNIFICANT CHANGE UP (ref 9.5–13)
RBC # BLD: 2.21 M/UL — LOW (ref 4.2–5.8)
RBC # BLD: 2.29 M/UL — LOW (ref 4.2–5.8)
RBC # BLD: 3.46 M/UL — LOW (ref 4.2–5.8)
RBC # FLD: 15.2 % — HIGH (ref 10.3–14.5)
RBC # FLD: 15.9 % — HIGH (ref 10.3–14.5)
RBC # FLD: 15.9 % — HIGH (ref 10.3–14.5)
RH IG SCN BLD-IMP: POSITIVE — SIGNIFICANT CHANGE UP
SODIUM SERPL-SCNC: 138 MMOL/L — SIGNIFICANT CHANGE UP (ref 135–145)
SODIUM SERPL-SCNC: 139 MMOL/L — SIGNIFICANT CHANGE UP (ref 135–145)
WBC # BLD: 6.77 K/UL — SIGNIFICANT CHANGE UP (ref 3.8–10.5)
WBC # BLD: 7.36 K/UL — SIGNIFICANT CHANGE UP (ref 3.8–10.5)
WBC # BLD: 7.69 K/UL — SIGNIFICANT CHANGE UP (ref 3.8–10.5)
WBC # FLD AUTO: 6.77 K/UL — SIGNIFICANT CHANGE UP (ref 3.8–10.5)
WBC # FLD AUTO: 7.36 K/UL — SIGNIFICANT CHANGE UP (ref 3.8–10.5)
WBC # FLD AUTO: 7.69 K/UL — SIGNIFICANT CHANGE UP (ref 3.8–10.5)

## 2024-04-08 PROCEDURE — 71045 X-RAY EXAM CHEST 1 VIEW: CPT | Mod: 26,77,59

## 2024-04-08 PROCEDURE — 71045 X-RAY EXAM CHEST 1 VIEW: CPT | Mod: 26,59

## 2024-04-08 PROCEDURE — 71046 X-RAY EXAM CHEST 2 VIEWS: CPT | Mod: 26

## 2024-04-08 PROCEDURE — 93308 TTE F-UP OR LMTD: CPT | Mod: 26

## 2024-04-08 PROCEDURE — 99232 SBSQ HOSP IP/OBS MODERATE 35: CPT

## 2024-04-08 RX ORDER — HEPARIN SODIUM 5000 [USP'U]/ML
5000 INJECTION INTRAVENOUS; SUBCUTANEOUS EVERY 12 HOURS
Refills: 0 | Status: DISCONTINUED | OUTPATIENT
Start: 2024-04-08 | End: 2024-04-09

## 2024-04-08 RX ORDER — FUROSEMIDE 40 MG
40 TABLET ORAL DAILY
Refills: 0 | Status: DISCONTINUED | OUTPATIENT
Start: 2024-04-09 | End: 2024-04-09

## 2024-04-08 RX ORDER — FUROSEMIDE 40 MG
40 TABLET ORAL DAILY
Refills: 0 | Status: DISCONTINUED | OUTPATIENT
Start: 2024-04-08 | End: 2024-04-08

## 2024-04-08 RX ORDER — METOCLOPRAMIDE HCL 10 MG
10 TABLET ORAL ONCE
Refills: 0 | Status: DISCONTINUED | OUTPATIENT
Start: 2024-04-08 | End: 2024-04-08

## 2024-04-08 RX ORDER — METOCLOPRAMIDE HCL 10 MG
10 TABLET ORAL ONCE
Refills: 0 | Status: COMPLETED | OUTPATIENT
Start: 2024-04-08 | End: 2024-04-09

## 2024-04-08 RX ORDER — FUROSEMIDE 40 MG
20 TABLET ORAL ONCE
Refills: 0 | Status: COMPLETED | OUTPATIENT
Start: 2024-04-08 | End: 2024-04-08

## 2024-04-08 RX ORDER — POTASSIUM CHLORIDE 20 MEQ
20 PACKET (EA) ORAL DAILY
Refills: 0 | Status: DISCONTINUED | OUTPATIENT
Start: 2024-04-09 | End: 2024-04-09

## 2024-04-08 RX ORDER — POTASSIUM CHLORIDE 20 MEQ
20 PACKET (EA) ORAL DAILY
Refills: 0 | Status: DISCONTINUED | OUTPATIENT
Start: 2024-04-08 | End: 2024-04-08

## 2024-04-08 RX ORDER — ACETAMINOPHEN 500 MG
975 TABLET ORAL EVERY 6 HOURS
Refills: 0 | Status: DISCONTINUED | OUTPATIENT
Start: 2024-04-08 | End: 2024-04-09

## 2024-04-08 RX ORDER — SODIUM CHLORIDE 9 MG/ML
3 INJECTION INTRAMUSCULAR; INTRAVENOUS; SUBCUTANEOUS EVERY 8 HOURS
Refills: 0 | Status: DISCONTINUED | OUTPATIENT
Start: 2024-04-08 | End: 2024-04-09

## 2024-04-08 RX ORDER — FLUTICASONE PROPIONATE 50 MCG
1 SPRAY, SUSPENSION NASAL
Refills: 0 | Status: DISCONTINUED | OUTPATIENT
Start: 2024-04-08 | End: 2024-04-09

## 2024-04-08 RX ORDER — LIDOCAINE 4 G/100G
1 CREAM TOPICAL DAILY
Refills: 0 | Status: DISCONTINUED | OUTPATIENT
Start: 2024-04-08 | End: 2024-04-09

## 2024-04-08 RX ORDER — METOPROLOL TARTRATE 50 MG
12.5 TABLET ORAL EVERY 8 HOURS
Refills: 0 | Status: DISCONTINUED | OUTPATIENT
Start: 2024-04-08 | End: 2024-04-09

## 2024-04-08 RX ORDER — ASCORBIC ACID 60 MG
500 TABLET,CHEWABLE ORAL
Refills: 0 | Status: DISCONTINUED | OUTPATIENT
Start: 2024-04-08 | End: 2024-04-09

## 2024-04-08 RX ADMIN — Medication 975 MILLIGRAM(S): at 18:07

## 2024-04-08 RX ADMIN — Medication 250 MILLIGRAM(S): at 13:16

## 2024-04-08 RX ADMIN — Medication 20 MILLIGRAM(S): at 01:33

## 2024-04-08 RX ADMIN — LIDOCAINE 1 PATCH: 4 CREAM TOPICAL at 11:19

## 2024-04-08 RX ADMIN — BUPROPION HYDROCHLORIDE 150 MILLIGRAM(S): 150 TABLET, EXTENDED RELEASE ORAL at 12:13

## 2024-04-08 RX ADMIN — SENNA PLUS 2 TABLET(S): 8.6 TABLET ORAL at 21:35

## 2024-04-08 RX ADMIN — OXYCODONE HYDROCHLORIDE 5 MILLIGRAM(S): 5 TABLET ORAL at 00:00

## 2024-04-08 RX ADMIN — Medication 975 MILLIGRAM(S): at 12:13

## 2024-04-08 RX ADMIN — HEPARIN SODIUM 5000 UNIT(S): 5000 INJECTION INTRAVENOUS; SUBCUTANEOUS at 06:13

## 2024-04-08 RX ADMIN — Medication 975 MILLIGRAM(S): at 13:00

## 2024-04-08 RX ADMIN — Medication 81 MILLIGRAM(S): at 12:12

## 2024-04-08 RX ADMIN — Medication 1 SPRAY(S): at 17:08

## 2024-04-08 RX ADMIN — PANTOPRAZOLE SODIUM 40 MILLIGRAM(S): 20 TABLET, DELAYED RELEASE ORAL at 06:14

## 2024-04-08 RX ADMIN — Medication 500 MILLIGRAM(S): at 17:07

## 2024-04-08 RX ADMIN — POLYETHYLENE GLYCOL 3350 17 GRAM(S): 17 POWDER, FOR SOLUTION ORAL at 12:12

## 2024-04-08 RX ADMIN — LIDOCAINE 1 PATCH: 4 CREAM TOPICAL at 17:21

## 2024-04-08 RX ADMIN — ESCITALOPRAM OXALATE 20 MILLIGRAM(S): 10 TABLET, FILM COATED ORAL at 21:35

## 2024-04-08 RX ADMIN — MUPIROCIN 1 APPLICATION(S): 20 OINTMENT TOPICAL at 06:14

## 2024-04-08 RX ADMIN — HEPARIN SODIUM 5000 UNIT(S): 5000 INJECTION INTRAVENOUS; SUBCUTANEOUS at 17:07

## 2024-04-08 RX ADMIN — CLOPIDOGREL BISULFATE 75 MILLIGRAM(S): 75 TABLET, FILM COATED ORAL at 12:13

## 2024-04-08 RX ADMIN — OXYCODONE HYDROCHLORIDE 5 MILLIGRAM(S): 5 TABLET ORAL at 15:56

## 2024-04-08 RX ADMIN — Medication 975 MILLIGRAM(S): at 17:07

## 2024-04-08 RX ADMIN — ATORVASTATIN CALCIUM 40 MILLIGRAM(S): 80 TABLET, FILM COATED ORAL at 21:35

## 2024-04-08 RX ADMIN — SODIUM CHLORIDE 3 MILLILITER(S): 9 INJECTION INTRAMUSCULAR; INTRAVENOUS; SUBCUTANEOUS at 21:25

## 2024-04-08 RX ADMIN — Medication 300 MILLIGRAM(S): at 12:13

## 2024-04-08 RX ADMIN — MUPIROCIN 1 APPLICATION(S): 20 OINTMENT TOPICAL at 17:16

## 2024-04-08 RX ADMIN — Medication 20 MILLIGRAM(S): at 10:55

## 2024-04-08 RX ADMIN — OXYCODONE HYDROCHLORIDE 5 MILLIGRAM(S): 5 TABLET ORAL at 16:50

## 2024-04-08 NOTE — PROGRESS NOTE ADULT - REASON FOR ADMISSION
CAD
Chest pain at rest and exertion
CAD
CAD
Chest pain at rest and exertion

## 2024-04-08 NOTE — PROGRESS NOTE ADULT - ASSESSMENT
78 y.o M with PMHX HTN, DM-II, HLD, CKD (baseline Cr 1.7-2.0), prostate cancer, who initially presented to ED with chest pain and SOB that started after abnormal outpatient exercise stress test  04/01/24 with residual symptoms at home after procedure. He was admitted to cardiology and on 4/2 had cardiac cath that revealed pLAD 60-70%, D1 mild diffuse disease L-L collaterals to LAD, mLAD  100%, prox-distal LCx 90%, OM1 mild diffuse disease, RCA mild diffuse disease. Patient admitted to CT surgery service for CAD and severe AS.  Patient underwent OPCAB x3 on 4/5/24.  Patient arrived intubated levo, POD 1 extubated, weaned off levo, transfused 1 unit prbc, POD 2 transfused 1 unit prbc, Pleural CT removed, POD 3 transfused 1 unit PRBC, TTE with no pericardial effusion, delined, meds removed and transferred to floor.    ==== Neurovascular ====  -No delirium, pain well managed on current regimen  -C/w PRNs for Pain control  -Monitor neuro status    ==== Respiratory ====  -Saturates well on RA     -AM CXR stable, repeat in AM  -Encourage IS 10x/hour while awake, Cough and deep breathing exercises  -Monitor respiratory status via SpO2    ==== Cardiovascular ====  Monitor on Tele  Continue aspirin 81mg QD and plavix  75mg QD  Continue Statin  Continue Lopressor 12.5mg Q8hrs for permissive HTN for renal perfusion    ==== GI ====   -Tolerating PO  -Prophylaxis: Protonix  -C/w bowel regimen    ==== /Renal ====  -BUN/Cr: 41/2.10, allow for permissive HTN for renal perfusion  -Trend Cr on AM labs  -Replete electrolytes as needed    ==== ID ====   Afebrile, asymptomatic  -WBC: 7.36    ==== Endocrine ====   -A1C: 5.3, no h/o DM  -TSH: 2.73, no h/o thyroid disease     ==== Hematologic ====   -H/H: 7.36  -CBC, chem in AM  -DVT ppx: HSQ 5000u q8h and SCDs    ==== Disposition Planning ====  Telemetry

## 2024-04-08 NOTE — PROGRESS NOTE ADULT - SUBJECTIVE AND OBJECTIVE BOX
CTICU  CRITICAL  CARE  attending     Hand off received 					   Pertinent clinical, laboratory, radiographic, hemodynamic, echocardiographic, respiratory data, microbiologic data and chart were reviewed and analyzed frequently throughout the course of the day and night  Patient seen and examined with CTS/ SH attending at bedside    Pt is a 78y , Male, post op day # 3 s/p CABG x 3; off pump; post op EF nl    post op:    remains extubated  acute post Hemorrhagic anemia  borderline BP ( systolic 's)  Stable Cr ( hx of CKD    today:    s/p 2 units PRBC  stable hemodynamics  serum Cr trending down to baseline    79 y/o male with HTN, DM-II, Hypercholesterolemia, prostate Ca (s/p radiation, no current treatment), CKD, presents to the [facility] ER with chest pain that started over last 4-5 weeks. Pain initially started while walking in [place], relieved with rest. Since then, he has been experiencing pain at rest and minimal exertion, increased in frequency and associated with SOB. Pain is rated 7-8/10, lasting a few minutes. He had an episode of nausea and vomiting day before admission.   He had a stress test on day of admission that was abnormal -- as per the cardiologist Dr Morton, pt achieved 4.5 mins on treadmill, reached target HR of 129bpm. He had chest discomfort at 3rd minute, EKG showed PVCs, no ST-T changes. Chest pain resolved once he stopped exercise. Shortly after, EKG showed slurred ST segments in inferolateral leads that took 7 mins to resolve. He was sent home with recommendation to return for a planned nuclear stress test the next day. However, he had multiple episodes of chest pain, SOB after leaving the office, therefore, he came to the ER for evaluation    Chest pain    Anemia    HTN (hypertension)    Diabetes    Hypercholesterolemia    Prostate cancer    Chronic kidney disease, unspecified CKD stage        , FAMILY HISTORY:  FH: CAD (coronary artery disease) (Father, Sibling)    PAST MEDICAL & SURGICAL HISTORY:  Gout      Prostate cancer      IBS (irritable bowel syndrome)      Hypertension      Diabetes      Hypercholesterolemia      H/O arthritis      Genital herpes      Obstructive sleep apnea      Shingles      History of tonsillectomy      S/P tooth extraction      Injury of meniscus of left knee        Patient is a 78y old  Male who presents with a chief complaint of CAD (07 Apr 2024 21:04)      14 system review limited 2/2 post op morbidity    Vital signs, hemodynamic and respiratory parameters were reviewed from the bedside nursing flowsheet.  ICU Vital Signs Last 24 Hrs  T(C): 36.7 (08 Apr 2024 08:57), Max: 37.5 (07 Apr 2024 17:11)  T(F): 98 (08 Apr 2024 08:57), Max: 99.5 (07 Apr 2024 17:11)  HR: 79 (08 Apr 2024 13:00) (71 - 104)  BP: 95/52 (08 Apr 2024 13:00) (86/52 - 128/70)  BP(mean): 68 (08 Apr 2024 13:00) (64 - 91)  ABP: 139/66 (07 Apr 2024 17:00) (111/46 - 139/66)  ABP(mean): 90 (07 Apr 2024 17:00) (67 - 90)  RR: 16 (08 Apr 2024 13:00) (16 - 23)  SpO2: 93% (08 Apr 2024 13:00) (91% - 98%)    O2 Parameters below as of 08 Apr 2024 13:00  Patient On (Oxygen Delivery Method): room air          Adult Advanced Hemodynamics Last 24 Hrs  CVP(mm Hg): 2 (07 Apr 2024 14:00) (2 - 2)  CVP(cm H2O): --  CO: --  CI: --  PA: --  PA(mean): --  PCWP: --  SVR: --  SVRI: --  PVR: --  PVRI: --, ABG - ( 07 Apr 2024 12:02 )  pH, Arterial: 7.37  pH, Blood: x     /  pCO2: 43    /  pO2: 75    / HCO3: 25    / Base Excess: -0.6  /  SaO2: 96.9                Intake and output was reviewed and the fluid balance was calculated  Daily     Daily   I&O's Summary    07 Apr 2024 07:01  -  08 Apr 2024 07:00  --------------------------------------------------------  IN: 3530 mL / OUT: 3775 mL / NET: -245 mL    08 Apr 2024 07:01  -  08 Apr 2024 13:41  --------------------------------------------------------  IN: 240 mL / OUT: 1250 mL / NET: -1010 mL        All lines and drain sites were assessed  [facility]      POCT Blood Glucose.: 115 mg/dL (08 Apr 2024 11:16)    No acute change in mental status  Auscultation of the chest reveals equal bs  Abdomen is soft  Extremities are warm and well perfused  Wounds appear clean and unremarkable  Antibiotics are periop    labs  CBC Full  -  ( 08 Apr 2024 10:53 )  WBC Count : 7.36 K/uL  RBC Count : 3.46 M/uL  Hemoglobin : 10.7 g/dL  Hematocrit : 32.0 %  Platelet Count - Automated : 107 K/uL  Mean Cell Volume : 92.5 fl  Mean Cell Hemoglobin : 30.9 pg  Mean Cell Hemoglobin Concentration : 33.4 gm/dL  Auto Neutrophil # : 5.61 K/uL  Auto Lymphocyte # : 0.92 K/uL  Auto Monocyte # : 0.64 K/uL  Auto Eosinophil # : 0.12 K/uL  Auto Basophil # : 0.02 K/uL  Auto Neutrophil % : 76.2 %  Auto Lymphocyte % : 12.5 %  Auto Monocyte % : 8.7 %  Auto Eosinophil % : 1.6 %  Auto Basophil % : 0.3 %    04-08    138  |  101  |  41<H>  ----------------------------<  107<H>  4.4   |  26  |  2.10<H>    Ca    9.0      08 Apr 2024 10:53  Phos  4.4     04-08  Mg     2.1     04-08    TPro  5.9<L>  /  Alb  3.5  /  TBili  1.3<H>  /  DBili  x   /  AST  20  /  ALT  20  /  AlkPhos  89  04-08    PT/INR - ( 08 Apr 2024 10:53 )   PT: 10.0 sec;   INR: 0.87          PTT - ( 08 Apr 2024 10:53 )  PTT:46.9 sec  The current medications were reviewed   MEDICATIONS  (STANDING):  acetaminophen     Tablet .. 975 milliGRAM(s) Oral every 6 hours  allopurinol 300 milliGRAM(s) Oral daily  ascorbic acid 500 milliGRAM(s) Oral two times a day  aspirin enteric coated 81 milliGRAM(s) Oral daily  atorvastatin 40 milliGRAM(s) Oral at bedtime  buPROPion XL (24-Hour) . 150 milliGRAM(s) Oral daily  chlorhexidine 2% Cloths 1 Application(s) Topical daily  clopidogrel Tablet 75 milliGRAM(s) Oral daily  dextrose 10% Bolus 125 milliLiter(s) IV Bolus once  dextrose 5%. 1000 milliLiter(s) (50 mL/Hr) IV Continuous <Continuous>  dextrose 5%. 1000 milliLiter(s) (100 mL/Hr) IV Continuous <Continuous>  escitalopram 20 milliGRAM(s) Oral daily  fluticasone propionate 50 MICROgram(s)/spray Nasal Spray 1 Spray(s) Both Nostrils two times a day  glucagon  Injectable 1 milliGRAM(s) IntraMuscular once  heparin   Injectable 5000 Unit(s) SubCutaneous every 12 hours  insulin lispro (ADMELOG) corrective regimen sliding scale   SubCutaneous Before meals and at bedtime  lidocaine   4% Patch 1 Patch Transdermal daily  metoprolol tartrate 12.5 milliGRAM(s) Oral every 8 hours  mupirocin 2% Nasal 1 Application(s) Both Nostrils two times a day  pantoprazole    Tablet 40 milliGRAM(s) Oral before breakfast  polyethylene glycol 3350 17 Gram(s) Oral daily  senna 2 Tablet(s) Oral at bedtime  sodium chloride 0.9%. 1000 milliLiter(s) (10 mL/Hr) IV Continuous <Continuous>    MEDICATIONS  (PRN):  dextrose Oral Gel 15 Gram(s) Oral once PRN Blood Glucose LESS THAN 70 milliGRAM(s)/deciliter  oxyCODONE    IR 5 milliGRAM(s) Oral every 4 hours PRN Moderate Pain (4 - 6)       PROBLEM LIST/ ASSESSMENT:  HEALTH ISSUES - PROBLEM Dx:  Chest pain    Anemia    HTN (hypertension)    Diabetes    Hypercholesterolemia    Prostate cancer    Chronic kidney disease, unspecified CKD stage        ,   Patient is a 78y old  Male who presents with a chief complaint of CAD (07 Apr 2024 21:04)     s/p OPCAB x 3V      My plan includes :    Maintain MAP >70 ( slightly higher perfusion pressure for CKD)  trend H/H; transfuse if needed  Analgesia for acute post thoracotomy pain  strict blood sugar control    close hemodynamic, ventilatory and drain monitoring and management per post op routine    Monitor for arrhythmias and monitor parameters for organ perfusion  monitor neurologic status  Head of the bed should remain elevated to 45 deg .   chest PT and IS will be encouraged  monitor adequacy of oxygenation and ventilation and attempt to wean oxygen  Nutritional goals will be met using po eventually , ensure adequate caloric intake and montior the same  Stress ulcer and VTE prophylaxis will be achieved    Glycemic control is satisfactory  Electrolytes have been repleted as necessary and wound care has been carried out. Pain control has been achieved.   agressive physical therapy and early mobility and ambulation goals will be met   The family was updated about the course and plan  CRITICAL CARE TIME SPENT in evaluation and management, reassessments, review and interpretation of labs and x-rays, ventilator and hemodynamic management, formulating a plan and coordinating care: __30__ MIN.  Time does not include procedural time.  CTICU ATTENDING     					    Audi Bernardo MD

## 2024-04-08 NOTE — PROGRESS NOTE ADULT - SUBJECTIVE AND OBJECTIVE BOX
Patient discussed on morning rounds with       Operation / Date:     SUBJECTIVE ASSESSMENT:  78y Male         Vital Signs Last 24 Hrs  T(C): 36.7 (08 Apr 2024 17:01), Max: 37.3 (07 Apr 2024 21:18)  T(F): 98 (08 Apr 2024 17:01), Max: 99.2 (07 Apr 2024 21:18)  HR: 76 (08 Apr 2024 16:30) (71 - 104)  BP: 108/64 (08 Apr 2024 16:30) (86/52 - 128/70)  BP(mean): 80 (08 Apr 2024 16:30) (64 - 91)  RR: 18 (08 Apr 2024 16:30) (16 - 23)  SpO2: 92% (08 Apr 2024 16:30) (91% - 98%)    Parameters below as of 08 Apr 2024 16:30  Patient On (Oxygen Delivery Method): room air      I&O's Detail    07 Apr 2024 07:01  -  08 Apr 2024 07:00  --------------------------------------------------------  IN:    IV PiggyBack: 250 mL    Oral Fluid: 2480 mL    PRBCs (Packed Red Blood Cells): 740 mL    sodium chloride 0.9%: 60 mL  Total IN: 3530 mL    OUT:    Chest Tube (mL): 0 mL    Drain (mL): 50 mL    Indwelling Catheter - Urethral (mL): 1825 mL    Voided (mL): 1900 mL  Total OUT: 3775 mL    Total NET: -245 mL      08 Apr 2024 07:01  -  08 Apr 2024 17:22  --------------------------------------------------------  IN:    IV PiggyBack: 250 mL    Oral Fluid: 360 mL  Total IN: 610 mL    OUT:    Drain (mL): 0 mL    Voided (mL): 2150 mL  Total OUT: 2150 mL    Total NET: -1540 mL          CHEST TUBE:  Yes/No. AIR LEAKS: Yes/No. Suction / H2O SEAL.   ZARINA DRAIN:  Yes/No.  EPICARDIAL WIRES: Yes/No.  TIE DOWNS: Yes/No.  HERNÁNDEZ: Yes/No.    PHYSICAL EXAM:    General:     Neurological:    Cardiovascular:    Respiratory:    Gastrointestinal:    Extremities:    Vascular:    Incision Sites:    LABS:                        10.7   7.36  )-----------( 107      ( 08 Apr 2024 10:53 )             32.0       COUMADIN:  Yes/No. REASON: .    PT/INR - ( 08 Apr 2024 10:53 )   PT: 10.0 sec;   INR: 0.87          PTT - ( 08 Apr 2024 10:53 )  PTT:46.9 sec    04-08    138  |  101  |  41<H>  ----------------------------<  107<H>  4.4   |  26  |  2.10<H>    Ca    9.0      08 Apr 2024 10:53  Phos  4.4     04-08  Mg     2.1     04-08    TPro  5.9<L>  /  Alb  3.5  /  TBili  1.3<H>  /  DBili  x   /  AST  20  /  ALT  20  /  AlkPhos  89  04-08      Urinalysis Basic - ( 08 Apr 2024 10:53 )    Color: x / Appearance: x / SG: x / pH: x  Gluc: 107 mg/dL / Ketone: x  / Bili: x / Urobili: x   Blood: x / Protein: x / Nitrite: x   Leuk Esterase: x / RBC: x / WBC x   Sq Epi: x / Non Sq Epi: x / Bacteria: x        MEDICATIONS  (STANDING):  acetaminophen     Tablet .. 975 milliGRAM(s) Oral every 6 hours  allopurinol 300 milliGRAM(s) Oral daily  ascorbic acid 500 milliGRAM(s) Oral two times a day  aspirin enteric coated 81 milliGRAM(s) Oral daily  atorvastatin 40 milliGRAM(s) Oral at bedtime  buPROPion XL (24-Hour) . 150 milliGRAM(s) Oral daily  clopidogrel Tablet 75 milliGRAM(s) Oral daily  dextrose 10% Bolus 125 milliLiter(s) IV Bolus once  dextrose 5%. 1000 milliLiter(s) (50 mL/Hr) IV Continuous <Continuous>  dextrose 5%. 1000 milliLiter(s) (100 mL/Hr) IV Continuous <Continuous>  escitalopram 20 milliGRAM(s) Oral daily  fluticasone propionate 50 MICROgram(s)/spray Nasal Spray 1 Spray(s) Both Nostrils two times a day  glucagon  Injectable 1 milliGRAM(s) IntraMuscular once  heparin   Injectable 5000 Unit(s) SubCutaneous every 12 hours  insulin lispro (ADMELOG) corrective regimen sliding scale   SubCutaneous Before meals and at bedtime  lidocaine   4% Patch 1 Patch Transdermal daily  metoprolol tartrate 12.5 milliGRAM(s) Oral every 8 hours  mupirocin 2% Nasal 1 Application(s) Both Nostrils two times a day  pantoprazole    Tablet 40 milliGRAM(s) Oral before breakfast  polyethylene glycol 3350 17 Gram(s) Oral daily  senna 2 Tablet(s) Oral at bedtime  sodium chloride 0.9% lock flush 3 milliLiter(s) IV Push every 8 hours  sodium chloride 0.9%. 1000 milliLiter(s) (10 mL/Hr) IV Continuous <Continuous>    MEDICATIONS  (PRN):  dextrose Oral Gel 15 Gram(s) Oral once PRN Blood Glucose LESS THAN 70 milliGRAM(s)/deciliter  oxyCODONE    IR 5 milliGRAM(s) Oral every 4 hours PRN Moderate Pain (4 - 6)        RADIOLOGY & ADDITIONAL TESTS:     Patient discussed on morning rounds with Dr. Lopez    Operation / Date: 4/5/24 OPCAB x 3    SUBJECTIVE ASSESSMENT:  78y Male seen and examined at bedside.  Patient reporting incision pain.  Denies chest pain, shortness of breath, nausea, vomiting.        Vital Signs Last 24 Hrs  T(C): 36.7 (08 Apr 2024 17:01), Max: 37.3 (07 Apr 2024 21:18)  T(F): 98 (08 Apr 2024 17:01), Max: 99.2 (07 Apr 2024 21:18)  HR: 76 (08 Apr 2024 16:30) (71 - 104)  BP: 108/64 (08 Apr 2024 16:30) (86/52 - 128/70)  BP(mean): 80 (08 Apr 2024 16:30) (64 - 91)  RR: 18 (08 Apr 2024 16:30) (16 - 23)  SpO2: 92% (08 Apr 2024 16:30) (91% - 98%)    Parameters below as of 08 Apr 2024 16:30  Patient On (Oxygen Delivery Method): room air      I&O's Detail    07 Apr 2024 07:01  -  08 Apr 2024 07:00  --------------------------------------------------------  IN:    IV PiggyBack: 250 mL    Oral Fluid: 2480 mL    PRBCs (Packed Red Blood Cells): 740 mL    sodium chloride 0.9%: 60 mL  Total IN: 3530 mL    OUT:    Chest Tube (mL): 0 mL    Drain (mL): 50 mL    Indwelling Catheter - Urethral (mL): 1825 mL    Voided (mL): 1900 mL  Total OUT: 3775 mL    Total NET: -245 mL      08 Apr 2024 07:01  -  08 Apr 2024 17:22  --------------------------------------------------------  IN:    IV PiggyBack: 250 mL    Oral Fluid: 360 mL  Total IN: 610 mL    OUT:    Drain (mL): 0 mL    Voided (mL): 2150 mL  Total OUT: 2150 mL    Total NET: -1540 mL          CHEST TUBE:  No.   ZARINA DRAIN:  No.  EPICARDIAL WIRES: Yes.  TIE DOWNS: Yes.  HERNÁNDEZ: No.    PHYSICAL EXAM:    GEN: NAD, looks comfortable  Psych: Mood appropriate  Neuro: A&Ox3.  No focal deficits.  Moving all extremities.   HEENT: No obvious abnormalities  CV: S1S2, regular, no murmurs appreciated.  No carotid bruits.  No JVD  Lungs: Clear B/L.  No wheezing, rales or rhonchi  ABD: Soft, non-tender, non-distended.  +Bowel sounds  EXT: Warm and well perfused.  No peripheral edema noted  Musculoskeletal: Moving all extremities with normal ROM, no joint swelling  PV: Pedal pulses palpable   Incision: Midsternal incision CDI, no erythema or drainage, sternal click.    LABS:                        10.7   7.36  )-----------( 107      ( 08 Apr 2024 10:53 )             32.0       COUMADIN:  No. REASON: .    PT/INR - ( 08 Apr 2024 10:53 )   PT: 10.0 sec;   INR: 0.87          PTT - ( 08 Apr 2024 10:53 )  PTT:46.9 sec    04-08    138  |  101  |  41<H>  ----------------------------<  107<H>  4.4   |  26  |  2.10<H>    Ca    9.0      08 Apr 2024 10:53  Phos  4.4     04-08  Mg     2.1     04-08    TPro  5.9<L>  /  Alb  3.5  /  TBili  1.3<H>  /  DBili  x   /  AST  20  /  ALT  20  /  AlkPhos  89  04-08      Urinalysis Basic - ( 08 Apr 2024 10:53 )    Color: x / Appearance: x / SG: x / pH: x  Gluc: 107 mg/dL / Ketone: x  / Bili: x / Urobili: x   Blood: x / Protein: x / Nitrite: x   Leuk Esterase: x / RBC: x / WBC x   Sq Epi: x / Non Sq Epi: x / Bacteria: x        MEDICATIONS  (STANDING):  acetaminophen     Tablet .. 975 milliGRAM(s) Oral every 6 hours  allopurinol 300 milliGRAM(s) Oral daily  ascorbic acid 500 milliGRAM(s) Oral two times a day  aspirin enteric coated 81 milliGRAM(s) Oral daily  atorvastatin 40 milliGRAM(s) Oral at bedtime  buPROPion XL (24-Hour) . 150 milliGRAM(s) Oral daily  clopidogrel Tablet 75 milliGRAM(s) Oral daily  dextrose 10% Bolus 125 milliLiter(s) IV Bolus once  dextrose 5%. 1000 milliLiter(s) (50 mL/Hr) IV Continuous <Continuous>  dextrose 5%. 1000 milliLiter(s) (100 mL/Hr) IV Continuous <Continuous>  escitalopram 20 milliGRAM(s) Oral daily  fluticasone propionate 50 MICROgram(s)/spray Nasal Spray 1 Spray(s) Both Nostrils two times a day  glucagon  Injectable 1 milliGRAM(s) IntraMuscular once  heparin   Injectable 5000 Unit(s) SubCutaneous every 12 hours  insulin lispro (ADMELOG) corrective regimen sliding scale   SubCutaneous Before meals and at bedtime  lidocaine   4% Patch 1 Patch Transdermal daily  metoprolol tartrate 12.5 milliGRAM(s) Oral every 8 hours  mupirocin 2% Nasal 1 Application(s) Both Nostrils two times a day  pantoprazole    Tablet 40 milliGRAM(s) Oral before breakfast  polyethylene glycol 3350 17 Gram(s) Oral daily  senna 2 Tablet(s) Oral at bedtime  sodium chloride 0.9% lock flush 3 milliLiter(s) IV Push every 8 hours  sodium chloride 0.9%. 1000 milliLiter(s) (10 mL/Hr) IV Continuous <Continuous>    MEDICATIONS  (PRN):  dextrose Oral Gel 15 Gram(s) Oral once PRN Blood Glucose LESS THAN 70 milliGRAM(s)/deciliter  oxyCODONE    IR 5 milliGRAM(s) Oral every 4 hours PRN Moderate Pain (4 - 6)        RADIOLOGY & ADDITIONAL TESTS:

## 2024-04-08 NOTE — PROGRESS NOTE ADULT - PROVIDER SPECIALTY LIST ADULT
Critical Care
CT Surgery
Cardiology
Critical Care
Thoracic Surgery
CT Surgery
Critical Care

## 2024-04-09 ENCOUNTER — NON-APPOINTMENT (OUTPATIENT)
Age: 79
End: 2024-04-09

## 2024-04-09 ENCOUNTER — TRANSCRIPTION ENCOUNTER (OUTPATIENT)
Age: 79
End: 2024-04-09

## 2024-04-09 VITALS
RESPIRATION RATE: 16 BRPM | DIASTOLIC BLOOD PRESSURE: 57 MMHG | HEART RATE: 87 BPM | SYSTOLIC BLOOD PRESSURE: 124 MMHG | OXYGEN SATURATION: 96 %

## 2024-04-09 LAB
ANION GAP SERPL CALC-SCNC: 7 MMOL/L — SIGNIFICANT CHANGE UP (ref 5–17)
BUN SERPL-MCNC: 52 MG/DL — HIGH (ref 7–23)
CALCIUM SERPL-MCNC: 8.5 MG/DL — SIGNIFICANT CHANGE UP (ref 8.4–10.5)
CHLORIDE SERPL-SCNC: 102 MMOL/L — SIGNIFICANT CHANGE UP (ref 96–108)
CO2 SERPL-SCNC: 25 MMOL/L — SIGNIFICANT CHANGE UP (ref 22–31)
CREAT SERPL-MCNC: 2.05 MG/DL — HIGH (ref 0.5–1.3)
EGFR: 33 ML/MIN/1.73M2 — LOW
GLUCOSE SERPL-MCNC: 103 MG/DL — HIGH (ref 70–99)
HCT VFR BLD CALC: 28.6 % — LOW (ref 39–50)
HGB BLD-MCNC: 9.6 G/DL — LOW (ref 13–17)
MAGNESIUM SERPL-MCNC: 1.9 MG/DL — SIGNIFICANT CHANGE UP (ref 1.6–2.6)
MCHC RBC-ENTMCNC: 30.9 PG — SIGNIFICANT CHANGE UP (ref 27–34)
MCHC RBC-ENTMCNC: 33.6 GM/DL — SIGNIFICANT CHANGE UP (ref 32–36)
MCV RBC AUTO: 92 FL — SIGNIFICANT CHANGE UP (ref 80–100)
NRBC # BLD: 0 /100 WBCS — SIGNIFICANT CHANGE UP (ref 0–0)
PLATELET # BLD AUTO: 106 K/UL — LOW (ref 150–400)
POTASSIUM SERPL-MCNC: 4.6 MMOL/L — SIGNIFICANT CHANGE UP (ref 3.5–5.3)
POTASSIUM SERPL-SCNC: 4.6 MMOL/L — SIGNIFICANT CHANGE UP (ref 3.5–5.3)
RBC # BLD: 3.11 M/UL — LOW (ref 4.2–5.8)
RBC # FLD: 15.7 % — HIGH (ref 10.3–14.5)
SODIUM SERPL-SCNC: 134 MMOL/L — LOW (ref 135–145)
WBC # BLD: 7.86 K/UL — SIGNIFICANT CHANGE UP (ref 3.8–10.5)
WBC # FLD AUTO: 7.86 K/UL — SIGNIFICANT CHANGE UP (ref 3.8–10.5)

## 2024-04-09 PROCEDURE — 71046 X-RAY EXAM CHEST 2 VIEWS: CPT

## 2024-04-09 PROCEDURE — 94150 VITAL CAPACITY TEST: CPT

## 2024-04-09 PROCEDURE — 83540 ASSAY OF IRON: CPT

## 2024-04-09 PROCEDURE — 94640 AIRWAY INHALATION TREATMENT: CPT

## 2024-04-09 PROCEDURE — 93880 EXTRACRANIAL BILAT STUDY: CPT

## 2024-04-09 PROCEDURE — 84295 ASSAY OF SERUM SODIUM: CPT

## 2024-04-09 PROCEDURE — 84132 ASSAY OF SERUM POTASSIUM: CPT

## 2024-04-09 PROCEDURE — 83550 IRON BINDING TEST: CPT

## 2024-04-09 PROCEDURE — 93321 DOPPLER ECHO F-UP/LMTD STD: CPT

## 2024-04-09 PROCEDURE — 84466 ASSAY OF TRANSFERRIN: CPT

## 2024-04-09 PROCEDURE — 80053 COMPREHEN METABOLIC PANEL: CPT

## 2024-04-09 PROCEDURE — P9045: CPT

## 2024-04-09 PROCEDURE — 82553 CREATINE MB FRACTION: CPT

## 2024-04-09 PROCEDURE — 80202 ASSAY OF VANCOMYCIN: CPT

## 2024-04-09 PROCEDURE — 84100 ASSAY OF PHOSPHORUS: CPT

## 2024-04-09 PROCEDURE — 82330 ASSAY OF CALCIUM: CPT

## 2024-04-09 PROCEDURE — 74174 CTA ABD&PLVS W/CONTRAST: CPT | Mod: MC

## 2024-04-09 PROCEDURE — 83036 HEMOGLOBIN GLYCOSYLATED A1C: CPT

## 2024-04-09 PROCEDURE — 71045 X-RAY EXAM CHEST 1 VIEW: CPT

## 2024-04-09 PROCEDURE — 82550 ASSAY OF CK (CPK): CPT

## 2024-04-09 PROCEDURE — C1753: CPT

## 2024-04-09 PROCEDURE — 82272 OCCULT BLD FECES 1-3 TESTS: CPT

## 2024-04-09 PROCEDURE — 85347 COAGULATION TIME ACTIVATED: CPT

## 2024-04-09 PROCEDURE — 83735 ASSAY OF MAGNESIUM: CPT

## 2024-04-09 PROCEDURE — 99285 EMERGENCY DEPT VISIT HI MDM: CPT

## 2024-04-09 PROCEDURE — 36415 COLL VENOUS BLD VENIPUNCTURE: CPT

## 2024-04-09 PROCEDURE — C8929: CPT

## 2024-04-09 PROCEDURE — 80061 LIPID PANEL: CPT

## 2024-04-09 PROCEDURE — 96374 THER/PROPH/DIAG INJ IV PUSH: CPT

## 2024-04-09 PROCEDURE — 93005 ELECTROCARDIOGRAM TRACING: CPT

## 2024-04-09 PROCEDURE — 83605 ASSAY OF LACTIC ACID: CPT

## 2024-04-09 PROCEDURE — 82962 GLUCOSE BLOOD TEST: CPT

## 2024-04-09 PROCEDURE — 71275 CT ANGIOGRAPHY CHEST: CPT | Mod: MC

## 2024-04-09 PROCEDURE — 85014 HEMATOCRIT: CPT

## 2024-04-09 PROCEDURE — 86891 AUTOLOGOUS BLOOD OP SALVAGE: CPT

## 2024-04-09 PROCEDURE — C1887: CPT

## 2024-04-09 PROCEDURE — 80048 BASIC METABOLIC PNL TOTAL CA: CPT

## 2024-04-09 PROCEDURE — 86923 COMPATIBILITY TEST ELECTRIC: CPT

## 2024-04-09 PROCEDURE — 86901 BLOOD TYPING SEROLOGIC RH(D): CPT

## 2024-04-09 PROCEDURE — 84484 ASSAY OF TROPONIN QUANT: CPT

## 2024-04-09 PROCEDURE — 85610 PROTHROMBIN TIME: CPT

## 2024-04-09 PROCEDURE — 85730 THROMBOPLASTIN TIME PARTIAL: CPT

## 2024-04-09 PROCEDURE — 85045 AUTOMATED RETICULOCYTE COUNT: CPT

## 2024-04-09 PROCEDURE — 82947 ASSAY GLUCOSE BLOOD QUANT: CPT

## 2024-04-09 PROCEDURE — 81001 URINALYSIS AUTO W/SCOPE: CPT

## 2024-04-09 PROCEDURE — C1889: CPT

## 2024-04-09 PROCEDURE — 85576 BLOOD PLATELET AGGREGATION: CPT

## 2024-04-09 PROCEDURE — 94002 VENT MGMT INPAT INIT DAY: CPT

## 2024-04-09 PROCEDURE — 97161 PT EVAL LOW COMPLEX 20 MIN: CPT

## 2024-04-09 PROCEDURE — 84443 ASSAY THYROID STIM HORMONE: CPT

## 2024-04-09 PROCEDURE — 82803 BLOOD GASES ANY COMBINATION: CPT

## 2024-04-09 PROCEDURE — 86850 RBC ANTIBODY SCREEN: CPT

## 2024-04-09 PROCEDURE — 36430 TRANSFUSION BLD/BLD COMPNT: CPT

## 2024-04-09 PROCEDURE — 85025 COMPLETE CBC W/AUTO DIFF WBC: CPT

## 2024-04-09 PROCEDURE — 86803 HEPATITIS C AB TEST: CPT

## 2024-04-09 PROCEDURE — C1894: CPT

## 2024-04-09 PROCEDURE — 75572 CT HRT W/3D IMAGE: CPT | Mod: MC

## 2024-04-09 PROCEDURE — 82728 ASSAY OF FERRITIN: CPT

## 2024-04-09 PROCEDURE — 85027 COMPLETE CBC AUTOMATED: CPT

## 2024-04-09 PROCEDURE — C1769: CPT

## 2024-04-09 PROCEDURE — P9016: CPT

## 2024-04-09 PROCEDURE — 86900 BLOOD TYPING SEROLOGIC ABO: CPT

## 2024-04-09 RX ORDER — CLOPIDOGREL BISULFATE 75 MG/1
1 TABLET, FILM COATED ORAL
Qty: 30 | Refills: 0
Start: 2024-04-09 | End: 2024-05-08

## 2024-04-09 RX ORDER — ALLOPURINOL 300 MG
1 TABLET ORAL
Refills: 0 | DISCHARGE

## 2024-04-09 RX ORDER — MAGNESIUM OXIDE 400 MG ORAL TABLET 241.3 MG
400 TABLET ORAL ONCE
Refills: 0 | Status: COMPLETED | OUTPATIENT
Start: 2024-04-09 | End: 2024-04-09

## 2024-04-09 RX ORDER — PANTOPRAZOLE SODIUM 20 MG/1
1 TABLET, DELAYED RELEASE ORAL
Qty: 30 | Refills: 0
Start: 2024-04-09 | End: 2024-05-08

## 2024-04-09 RX ORDER — METFORMIN HYDROCHLORIDE 850 MG/1
1 TABLET ORAL
Qty: 60 | Refills: 0
Start: 2024-04-09 | End: 2024-05-08

## 2024-04-09 RX ORDER — ACETAMINOPHEN 500 MG
2 TABLET ORAL
Qty: 80 | Refills: 0
Start: 2024-04-09 | End: 2024-04-18

## 2024-04-09 RX ORDER — FUROSEMIDE 40 MG
1 TABLET ORAL
Qty: 7 | Refills: 0
Start: 2024-04-09 | End: 2024-04-15

## 2024-04-09 RX ORDER — METFORMIN HYDROCHLORIDE 850 MG/1
1 TABLET ORAL
Refills: 0 | DISCHARGE

## 2024-04-09 RX ORDER — ALLOPURINOL 300 MG
1 TABLET ORAL
Qty: 30 | Refills: 0
Start: 2024-04-09 | End: 2024-05-08

## 2024-04-09 RX ORDER — POTASSIUM CHLORIDE 20 MEQ
1 PACKET (EA) ORAL
Qty: 7 | Refills: 0
Start: 2024-04-09 | End: 2024-04-15

## 2024-04-09 RX ORDER — AMLODIPINE BESYLATE 2.5 MG/1
1 TABLET ORAL
Refills: 0 | DISCHARGE

## 2024-04-09 RX ORDER — ASPIRIN/CALCIUM CARB/MAGNESIUM 324 MG
1 TABLET ORAL
Qty: 30 | Refills: 0
Start: 2024-04-09 | End: 2024-05-08

## 2024-04-09 RX ORDER — OXYCODONE HYDROCHLORIDE 5 MG/1
1 TABLET ORAL
Qty: 20 | Refills: 0
Start: 2024-04-09 | End: 2024-04-13

## 2024-04-09 RX ORDER — ROSUVASTATIN CALCIUM 5 MG/1
0 TABLET ORAL
Refills: 0 | DISCHARGE

## 2024-04-09 RX ORDER — METOPROLOL TARTRATE 50 MG
0.5 TABLET ORAL
Qty: 45 | Refills: 0
Start: 2024-04-09 | End: 2024-05-08

## 2024-04-09 RX ORDER — ATORVASTATIN CALCIUM 80 MG/1
1 TABLET, FILM COATED ORAL
Qty: 30 | Refills: 0
Start: 2024-04-09 | End: 2024-05-08

## 2024-04-09 RX ADMIN — Medication 20 MILLIEQUIVALENT(S): at 12:06

## 2024-04-09 RX ADMIN — HEPARIN SODIUM 5000 UNIT(S): 5000 INJECTION INTRAVENOUS; SUBCUTANEOUS at 07:31

## 2024-04-09 RX ADMIN — Medication 975 MILLIGRAM(S): at 06:39

## 2024-04-09 RX ADMIN — PANTOPRAZOLE SODIUM 40 MILLIGRAM(S): 20 TABLET, DELAYED RELEASE ORAL at 06:39

## 2024-04-09 RX ADMIN — Medication 975 MILLIGRAM(S): at 07:30

## 2024-04-09 RX ADMIN — LIDOCAINE 1 PATCH: 4 CREAM TOPICAL at 11:42

## 2024-04-09 RX ADMIN — CLOPIDOGREL BISULFATE 75 MILLIGRAM(S): 75 TABLET, FILM COATED ORAL at 11:41

## 2024-04-09 RX ADMIN — Medication 81 MILLIGRAM(S): at 11:41

## 2024-04-09 RX ADMIN — SODIUM CHLORIDE 3 MILLILITER(S): 9 INJECTION INTRAMUSCULAR; INTRAVENOUS; SUBCUTANEOUS at 05:07

## 2024-04-09 RX ADMIN — BUPROPION HYDROCHLORIDE 150 MILLIGRAM(S): 150 TABLET, EXTENDED RELEASE ORAL at 11:41

## 2024-04-09 RX ADMIN — Medication 12.5 MILLIGRAM(S): at 06:42

## 2024-04-09 RX ADMIN — Medication 975 MILLIGRAM(S): at 11:42

## 2024-04-09 RX ADMIN — Medication 300 MILLIGRAM(S): at 11:41

## 2024-04-09 RX ADMIN — OXYCODONE HYDROCHLORIDE 5 MILLIGRAM(S): 5 TABLET ORAL at 09:52

## 2024-04-09 RX ADMIN — Medication 500 MILLIGRAM(S): at 06:39

## 2024-04-09 RX ADMIN — POLYETHYLENE GLYCOL 3350 17 GRAM(S): 17 POWDER, FOR SOLUTION ORAL at 11:42

## 2024-04-09 RX ADMIN — MUPIROCIN 1 APPLICATION(S): 20 OINTMENT TOPICAL at 07:06

## 2024-04-09 RX ADMIN — Medication 10 MILLIGRAM(S): at 00:22

## 2024-04-09 RX ADMIN — MAGNESIUM OXIDE 400 MG ORAL TABLET 400 MILLIGRAM(S): 241.3 TABLET ORAL at 07:55

## 2024-04-09 RX ADMIN — Medication 1 SPRAY(S): at 07:30

## 2024-04-09 RX ADMIN — Medication 40 MILLIGRAM(S): at 07:31

## 2024-04-09 RX ADMIN — ESCITALOPRAM OXALATE 20 MILLIGRAM(S): 10 TABLET, FILM COATED ORAL at 12:05

## 2024-04-09 RX ADMIN — LIDOCAINE 1 PATCH: 4 CREAM TOPICAL at 03:37

## 2024-04-09 RX ADMIN — OXYCODONE HYDROCHLORIDE 5 MILLIGRAM(S): 5 TABLET ORAL at 10:45

## 2024-04-09 NOTE — DISCHARGE NOTE NURSING/CASE MANAGEMENT/SOCIAL WORK - NSDCFUADDAPPT_GEN_ALL_CORE_FT
-Please follow up with Dr. Lopez in 1-2 weeks.  Our office staff will call you with your appointment day/time.  If you don't hear from us by next week please call our office to confirm your follow-up visit.  The office is located at [facility], 4th floor. Call us with any questions #[phone].    -You have one stitch in your chest, from your chest tube site.  This will come out in the office.  Please keep your dressing intact for the next 2 days.  On Thursday you can remove it and shower as per usual.  Wash the area with soap and water.     -Walk daily as tolerated and use your incentive spirometer every hour.    -No driving or strenuous activity/exercise for 6 weeks, or until cleared by your surgeon.    -Gently clean your incisions with anti-bacterial soap and water, pat dry.  You may leave them open to air.    -Call your doctor if you have shortness of breath, chest pain not relieved by pain medication, dizziness, fever >101.5, or increased redness or drainage from incisions.

## 2024-04-09 NOTE — DISCHARGE NOTE NURSING/CASE MANAGEMENT/SOCIAL WORK - PATIENT PORTAL LINK FT
You can access the FollowMyHealth Patient Portal offered by [facility] [place] by registering at the following website: [email or web address]. By joining [employer]’s FollowMyHealth portal, you will also be able to view your health information using other applications (apps) compatible with our system.

## 2024-04-09 NOTE — DISCHARGE NOTE PROVIDER - HOSPITAL COURSE
This is a 79 y/o male with PMHX HTN, DM-II, HLD, CKD (baseline Cr 1.7-2.0), prostate cancer, who initially presented to ED with chest pain and SOB that started after abnormal outpatient exercise stress test  04/01/24 with residual symptoms at home after procedure. He was admitted to cardiology and on 4/2/24 had cardiac cath that revealed pLAD 60-70%, D1 mild diffuse disease L-L collaterals to LAD, mLAD  100%, prox-distal LCx 90%, OM1 mild diffuse disease, RCA mild diffuse disease. Patient admitted to CT surgery service for CAD, and moderate to severe AS.  Patient underwent OPCAB x3 on 4/5/24.  Patient arrived intubated levo, POD 1 extubated, weaned off levo, transfused 1 unit prbc, POD 2 transfused 1 unit prbc, Pleural CT removed, POD 3 transfused 1 unit PRBC, TTE with no pericardial effusion, delined, mediastinal drains removed and transferred to floor.  Today, POD 4 patient looks well and is eager to go home.  He is walking multiple times per day, using his IS and tolerating PO diet.  Had a normal BM since surgery.  Denies CP/SOB/N/V/D/dizziness/cough/fever/chills.Surgical pain controlled.  H/H remains stable.        GEN: NAD, looks comfortable  Psych: Mood appropriate  Neuro: A&Ox3.  No focal deficits.  Moving all extremities.   HEENT: No obvious abnormalities  CV: S1S2, regular, no murmurs appreciated.  No carotid bruits.  No JVD  Lungs: Clear B/L.  No wheezing, rales or rhonchi  ABD: Soft, non-tender, non-distended.  +Bowel sounds  EXT: Warm and well perfused.  No peripheral edema noted  Musculoskeletal: Moving all extremities with normal ROM, no joint swelling  PV: Pedal pulses palpable  +Chest suture at chest tube site- only 2 days old and looks fresh.  Will keep in for now, to come out in the office at follow up visit.

## 2024-04-09 NOTE — DISCHARGE NOTE NURSING/CASE MANAGEMENT/SOCIAL WORK - NSDCPEFALRISK_GEN_ALL_CORE
For information on Fall & Injury Prevention, visit: [email or web address] OR  [email or web address] OR  https://www.cdc.gov/steadi/patient.html
NSR

## 2024-04-09 NOTE — DISCHARGE NOTE PROVIDER - CARE PROVIDER_API CALL
Pedro Lopez  Thoracic and Cardiac Surgery  [address]  [place] [address]  Phone: [phone]  Fax: [phone]  Follow Up Time:

## 2024-04-09 NOTE — DISCHARGE NOTE PROVIDER - NSDCFUADDAPPT_GEN_ALL_CORE_FT
-Please follow up with Dr. Lopez in 1-2 weeks.  Our office staff will call you with your appointment day/time.  If you don't hear from us by next week please call our office to confirm your follow-up visit.  The office is located at [facility], 4th floor. Call us with any questions #[phone].    -You have one stitch in your chest, from your chest tube site.  This will come out in the office.  Please keep your dressing intact for the next 2 days.  On Thursday you can remove it and shower as per usual.  Wash the area with soap and water.     -Walk daily as tolerated and use your incentive spirometer every hour.    -No driving or strenuous activity/exercise for 6 weeks, or until cleared by your surgeon.    -Gently clean your incisions with anti-bacterial soap and water, pat dry.  You may leave them open to air.    -Call your doctor if you have shortness of breath, chest pain not relieved by pain medication, dizziness, fever >101.5, or increased redness or drainage from incisions.

## 2024-04-09 NOTE — DISCHARGE NOTE PROVIDER - NSDCMRMEDTOKEN_GEN_ALL_CORE_FT
acetaminophen 325 mg oral tablet: 2 tab(s) orally every 6 hours  Allegra 180 mg oral tablet: 1 tab(s) orally once a day as needed for  allergy symptoms  allopurinol 300 mg oral tablet: 1 tab(s) orally once a day  aspirin 81 mg oral delayed release tablet: 1 tab(s) orally once a day  atorvastatin 40 mg oral tablet: 1 tab(s) orally once a day (at bedtime)  budesonide 32 mcg/inh nasal spray: 1 spray(s) in each nostril 2 times a day  buPROPion 150 mg/24 hours (XL) oral tablet, extended release: 1 tab(s) orally once a day  clopidogrel 75 mg oral tablet: 1 tab(s) orally once a day  Colcrys 0.6 mg oral tablet: 1 tab(s) orally once a day as needed for  gout pain  diclofenac 1% topical gel: Apply topically to affected area 4 times a day as needed for knee pain  furosemide 40 mg oral tablet: 1 tab(s) orally once a day  ketoconazole 2% topical cream: Apply topically to affected area 2 times a day  metFORMIN 1000 mg oral tablet: 1 tab(s) orally 2 times a day  metoprolol tartrate 25 mg oral tablet: 0.5 tab(s) orally every 8 hours  MiraLax oral powder for reconstitution: 17 gram(s) orally once a day as needed for  constipation  oxyCODONE 5 mg oral tablet: 1 tab(s) orally every 6 hours as needed for Moderate Pain (4 - 6) MDD: 4 tabs  pantoprazole 40 mg oral delayed release tablet: 1 tab(s) orally once a day (before a meal)  potassium chloride 20 mEq oral tablet, extended release: 1 tab(s) orally once a day  tiZANidine 2 mg oral capsule: 2 cap(s) orally once a day

## 2024-04-10 ENCOUNTER — NON-APPOINTMENT (OUTPATIENT)
Age: 79
End: 2024-04-10

## 2024-04-10 RX ORDER — ATORVASTATIN CALCIUM 40 MG/1
40 TABLET, FILM COATED ORAL
Qty: 90 | Refills: 3 | Status: ACTIVE | COMMUNITY

## 2024-04-10 RX ORDER — KETOCONAZOLE 20 MG/G
2 CREAM TOPICAL TWICE DAILY
Refills: 0 | Status: ACTIVE | COMMUNITY

## 2024-04-10 RX ORDER — PANTOPRAZOLE SODIUM 40 MG/1
40 TABLET, DELAYED RELEASE ORAL DAILY
Refills: 0 | Status: ACTIVE | COMMUNITY

## 2024-04-10 RX ORDER — BUDESONIDE 32 UG/1
32 SPRAY, METERED NASAL TWICE DAILY
Refills: 0 | Status: ACTIVE | COMMUNITY

## 2024-04-10 RX ORDER — METFORMIN HYDROCHLORIDE 1000 MG/1
1000 TABLET, COATED ORAL TWICE DAILY
Refills: 0 | Status: ACTIVE | COMMUNITY

## 2024-04-10 RX ORDER — ASPIRIN 81 MG
81 TABLET, DELAYED RELEASE (ENTERIC COATED) ORAL
Qty: 90 | Refills: 3 | Status: ACTIVE | COMMUNITY

## 2024-04-10 RX ORDER — ESCITALOPRAM OXALATE 10 MG/1
10 TABLET, FILM COATED ORAL AT BEDTIME
Refills: 0 | Status: ACTIVE | COMMUNITY

## 2024-04-10 RX ORDER — ALLOPURINOL 300 MG/1
300 TABLET ORAL DAILY
Refills: 0 | Status: ACTIVE | COMMUNITY

## 2024-04-10 RX ORDER — CLOPIDOGREL BISULFATE 75 MG/1
75 TABLET, FILM COATED ORAL DAILY
Qty: 30 | Refills: 0 | Status: ACTIVE | COMMUNITY

## 2024-04-10 RX ORDER — TIZANIDINE HYDROCHLORIDE 2 MG/1
2 CAPSULE ORAL DAILY
Refills: 0 | Status: ACTIVE | COMMUNITY

## 2024-04-10 RX ORDER — DICLOFENAC SODIUM 1% 10 MG/G
1 GEL TOPICAL DAILY
Refills: 0 | Status: ACTIVE | COMMUNITY

## 2024-04-10 RX ORDER — OXYCODONE 5 MG/1
5 TABLET ORAL EVERY 6 HOURS
Qty: 20 | Refills: 0 | Status: ACTIVE | COMMUNITY

## 2024-04-10 RX ORDER — FEXOFENADINE HYDROCHLORIDE 180 MG/1
180 TABLET, FILM COATED ORAL DAILY
Refills: 0 | Status: ACTIVE | COMMUNITY

## 2024-04-10 RX ORDER — BUPROPION HYDROCHLORIDE 150 MG/1
150 TABLET, EXTENDED RELEASE ORAL DAILY
Refills: 0 | Status: ACTIVE | COMMUNITY

## 2024-04-11 ENCOUNTER — APPOINTMENT (OUTPATIENT)
Dept: CARE COORDINATION | Facility: HOME HEALTH | Age: 79
End: 2024-04-11
Payer: MEDICARE

## 2024-04-11 VITALS
OXYGEN SATURATION: 97 % | DIASTOLIC BLOOD PRESSURE: 63 MMHG | SYSTOLIC BLOOD PRESSURE: 100 MMHG | HEART RATE: 78 BPM | WEIGHT: 157 LBS | RESPIRATION RATE: 16 BRPM

## 2024-04-11 PROBLEM — I25.10 CAD, MULTIPLE VESSEL: Status: ACTIVE | Noted: 2024-04-10

## 2024-04-11 PROBLEM — Z09 POSTOP CHECK: Status: ACTIVE | Noted: 2024-04-10

## 2024-04-11 PROBLEM — Z95.1 S/P CABG X 3: Status: ACTIVE | Noted: 2024-04-10

## 2024-04-11 LAB — ISTAT ACTK (ACTIVATED CLOTTING TIME KAOLIN): 244 SEC — HIGH (ref 74–137)

## 2024-04-11 PROCEDURE — 99024 POSTOP FOLLOW-UP VISIT: CPT

## 2024-04-11 NOTE — PHYSICAL EXAM
[Sclera] : the sclera and conjunctiva were normal [Jugular Venous Distention Increased] : there was no jugular-venous distention [Respiration, Rhythm And Depth] : normal respiratory rhythm and effort [Exaggerated Use Of Accessory Muscles For Inspiration] : no accessory muscle use [Decreased Breath Sounds] : decreased breath sounds [FreeTextEntry1] : MSI and drain sites are c/d/i margins are well approximated [Bowel Sounds] : normal bowel sounds [Abnormal Walk] : normal gait [Skin Color & Pigmentation] : normal skin color and pigmentation [Skin Turgor] : normal skin turgor [] : no rash [Oriented To Time, Place, And Person] : oriented to person, place, and time [Impaired Insight] : insight and judgment were intact [Affect] : the affect was normal

## 2024-04-11 NOTE — ASSESSMENT
[FreeTextEntry1] : Mr Lamb is recovering well at home s/p 3VOPCAB, accompanied by spouse Yue Waggoner and son. Reviewed all medications and dosages with patient understanding. Patient has all medications in home and is taking as prescribed. Pain controlled with current medication regimen. No new symptoms, issues or concerns. Reports ambulating around home independently, without the use if assistive device. Denies chest pain, SOB/GOMEZ, nausea/vomiting, constipation/diarrhea.  PLAN OF CARE  -Suture x1 removed.  -B/l LL rales [place]; IS use and return demonstration done patient pulling TV of 1500; encouraged IS use 10x q1h to improve circulation and breathing.   -Shower let water run over incision use antibacterial soap and pat dry; avoid all creams, ointments or lotions leave open to air.   -Encourage increased ambulation to include outdoors; avoiding extreme temperatures. Aim for 45mins q2h as tolerated.   -Start daily weights today; call immediately for weight gain >3lbs in a day/5lbs in a week.  -TAKE 1/2 tablet of Oxycodone 2.5mg along with Tylenol 2 tabs (650mg) at night for pain scale >5; this will provide more effective pain relief with least amount of narcotic. Can take additional Oxy 2.5mg if no relief within one hour.   Follow Up:  Pedro Lopez  Thoracic and Cardiac Surgery  [address], [address]  [place] [address]  Phone: [phone]  Fax: [phone]  Follow Up Time: 4/16/24 at 10am  [facility] team will continue to follow up with patient's status. NP/CCC roles explained with patient understanding, contact information provided. Patient agrees to call with any questions, issues or concerns. Worsening symptoms reviewed with patient with reiteration and understanding.

## 2024-04-11 NOTE — HISTORY OF PRESENT ILLNESS
[FreeTextEntry1] : [facility]: [facility]  24-hour post discharge follow-up and assessment    Matti Lamb is a 79 y/o male with PMHX HTN, DM-II, HLD, CKD (baseline Cr 1.7-2.0), prostate cancer, who initially presented to ED with chest pain and SOB that started after abnormal outpatient exercise stress test 04/01/24 with residual symptoms at home after procedure. He was admitted to cardiology and on 4/2/24 had cardiac cath that revealed pLAD 60-70%, D1 mild diffuse disease L-L collaterals to LAD, mLAD  100%, prox-distal LCx 90%, OM1 mild diffuse disease, RCA mild diffuse disease. Patient admitted to CT surgery service for CAD, and moderate to severe AS. Patient underwent OPCAB x3 on 4/5/24. Patient arrived intubated levo, POD 1 extubated, weaned off levo, transfused 1 unit prbc. Patient discharged to home on 4/9/2024.     Seen by [facility] for post discharge follow-up, Mr Lamb is recovering, on exam he is euvolemic, small/trivial pleural effusion on b/l lung bases. He is answering questions and conversing appropriately. He requires minimal assistance to stand is able to do ADLs independently. He can benefit from additional PT services.

## 2024-04-12 DIAGNOSIS — D64.9 ANEMIA, UNSPECIFIED: ICD-10-CM

## 2024-04-12 DIAGNOSIS — I12.9 HYPERTENSIVE CHRONIC KIDNEY DISEASE WITH STAGE 1 THROUGH STAGE 4 CHRONIC KIDNEY DISEASE, OR UNSPECIFIED CHRONIC KIDNEY DISEASE: ICD-10-CM

## 2024-04-12 DIAGNOSIS — I35.0 NONRHEUMATIC AORTIC (VALVE) STENOSIS: ICD-10-CM

## 2024-04-12 DIAGNOSIS — Z88.0 ALLERGY STATUS TO PENICILLIN: ICD-10-CM

## 2024-04-12 DIAGNOSIS — N18.30 CHRONIC KIDNEY DISEASE, STAGE 3 UNSPECIFIED: ICD-10-CM

## 2024-04-12 DIAGNOSIS — Z92.3 PERSONAL HISTORY OF IRRADIATION: ICD-10-CM

## 2024-04-12 DIAGNOSIS — D69.6 THROMBOCYTOPENIA, UNSPECIFIED: ICD-10-CM

## 2024-04-12 DIAGNOSIS — M10.9 GOUT, UNSPECIFIED: ICD-10-CM

## 2024-04-12 DIAGNOSIS — I25.110 ATHEROSCLEROTIC HEART DISEASE OF NATIVE CORONARY ARTERY WITH UNSTABLE ANGINA PECTORIS: ICD-10-CM

## 2024-04-12 DIAGNOSIS — Z85.46 PERSONAL HISTORY OF MALIGNANT NEOPLASM OF PROSTATE: ICD-10-CM

## 2024-04-12 DIAGNOSIS — D62 ACUTE POSTHEMORRHAGIC ANEMIA: ICD-10-CM

## 2024-04-12 DIAGNOSIS — Z79.84 LONG TERM (CURRENT) USE OF ORAL HYPOGLYCEMIC DRUGS: ICD-10-CM

## 2024-04-12 DIAGNOSIS — E87.1 HYPO-OSMOLALITY AND HYPONATREMIA: ICD-10-CM

## 2024-04-12 DIAGNOSIS — I47.20 VENTRICULAR TACHYCARDIA, UNSPECIFIED: ICD-10-CM

## 2024-04-12 DIAGNOSIS — K58.9 IRRITABLE BOWEL SYNDROME WITHOUT DIARRHEA: ICD-10-CM

## 2024-04-12 DIAGNOSIS — E11.22 TYPE 2 DIABETES MELLITUS WITH DIABETIC CHRONIC KIDNEY DISEASE: ICD-10-CM

## 2024-04-12 DIAGNOSIS — I25.82 CHRONIC TOTAL OCCLUSION OF CORONARY ARTERY: ICD-10-CM

## 2024-04-12 DIAGNOSIS — E87.5 HYPERKALEMIA: ICD-10-CM

## 2024-04-12 DIAGNOSIS — E87.20 ACIDOSIS, UNSPECIFIED: ICD-10-CM

## 2024-04-12 DIAGNOSIS — E78.00 PURE HYPERCHOLESTEROLEMIA, UNSPECIFIED: ICD-10-CM

## 2024-04-12 DIAGNOSIS — R57.8 OTHER SHOCK: ICD-10-CM

## 2024-04-12 DIAGNOSIS — G47.33 OBSTRUCTIVE SLEEP APNEA (ADULT) (PEDIATRIC): ICD-10-CM

## 2024-04-12 DIAGNOSIS — N17.9 ACUTE KIDNEY FAILURE, UNSPECIFIED: ICD-10-CM

## 2024-04-16 ENCOUNTER — OUTPATIENT (OUTPATIENT)
Dept: OUTPATIENT SERVICES | Facility: HOSPITAL | Age: 79
LOS: 1 days | End: 2024-04-16
Payer: MEDICARE

## 2024-04-16 ENCOUNTER — LABORATORY RESULT (OUTPATIENT)
Age: 79
End: 2024-04-16

## 2024-04-16 ENCOUNTER — APPOINTMENT (OUTPATIENT)
Dept: CARDIOTHORACIC SURGERY | Facility: CLINIC | Age: 79
End: 2024-04-16
Payer: MEDICARE

## 2024-04-16 VITALS
HEIGHT: 63 IN | SYSTOLIC BLOOD PRESSURE: 102 MMHG | HEART RATE: 58 BPM | BODY MASS INDEX: 28 KG/M2 | WEIGHT: 158 LBS | TEMPERATURE: 97.4 F | OXYGEN SATURATION: 97 % | DIASTOLIC BLOOD PRESSURE: 52 MMHG

## 2024-04-16 DIAGNOSIS — I25.10 ATHEROSCLEROTIC HEART DISEASE OF NATIVE CORONARY ARTERY W/OUT ANGINA PECTORIS: ICD-10-CM

## 2024-04-16 DIAGNOSIS — Z09 ENCOUNTER FOR FOLLOW-UP EXAMINATION AFTER COMPLETED TREATMENT FOR CONDITIONS OTHER THAN MALIGNANT NEOPLASM: ICD-10-CM

## 2024-04-16 DIAGNOSIS — Z90.89 ACQUIRED ABSENCE OF OTHER ORGANS: Chronic | ICD-10-CM

## 2024-04-16 DIAGNOSIS — S83.8X2A SPRAIN OF OTHER SPECIFIED PARTS OF LEFT KNEE, INITIAL ENCOUNTER: Chronic | ICD-10-CM

## 2024-04-16 DIAGNOSIS — K08.409 PARTIAL LOSS OF TEETH, UNSPECIFIED CAUSE, UNSPECIFIED CLASS: Chronic | ICD-10-CM

## 2024-04-16 DIAGNOSIS — Z95.1 PRESENCE OF AORTOCORONARY BYPASS GRAFT: ICD-10-CM

## 2024-04-16 DIAGNOSIS — I35.0 NONRHEUMATIC AORTIC (VALVE) STENOSIS: ICD-10-CM

## 2024-04-16 PROCEDURE — 71046 X-RAY EXAM CHEST 2 VIEWS: CPT

## 2024-04-16 PROCEDURE — 36415 COLL VENOUS BLD VENIPUNCTURE: CPT

## 2024-04-16 PROCEDURE — 99024 POSTOP FOLLOW-UP VISIT: CPT

## 2024-04-16 PROCEDURE — 71046 X-RAY EXAM CHEST 2 VIEWS: CPT | Mod: 26

## 2024-04-16 RX ORDER — POTASSIUM CHLORIDE 1500 MG/1
20 TABLET, EXTENDED RELEASE ORAL DAILY
Qty: 30 | Refills: 0 | Status: DISCONTINUED | COMMUNITY
End: 2024-04-16

## 2024-04-16 RX ORDER — METOPROLOL TARTRATE 25 MG/1
25 TABLET, FILM COATED ORAL
Qty: 14 | Refills: 3 | Status: ACTIVE | COMMUNITY

## 2024-04-16 RX ORDER — FUROSEMIDE 40 MG/1
40 TABLET ORAL
Qty: 30 | Refills: 0 | Status: DISCONTINUED | COMMUNITY
End: 2024-04-16

## 2024-04-17 NOTE — DISCUSSION/SUMMARY
[Doing Well] : is doing well [1] : 1 [FreeTextEntry1] : Plan: Continue current medication regimen. Continue to increase activity and walk daily as tolerated. Continue to use incentive spirometer.  No driving or strenuous activity for 4 weeks after surgery. Avoid lifting >10 to 15 lbs. Call MD if you experience fever, fatigue, dizziness, confusion, syncope, shortness of breath, chest pain not relieved with analgesics, increased redness/drainage from incision. Follow up with Dr. Morton blood sent today, results reviewed in sunrise  DC lasix and potassium decrease metoprolol to 12.5mg q12h Follow up with Dr. Thornton/D team in 6 months w/TTE (aortic stenosis)

## 2024-04-17 NOTE — PHYSICAL EXAM

## 2024-04-17 NOTE — END OF VISIT
[FreeTextEntry3] : I, Dr. ADAMSON [facility], personally performed the evaluation and management (E/M) services for this established patient who presents today with (a) new problem(s)/exacerbation of (an) existing condition(s).  That E/M includes conducting the clinically appropriate interval history &/or exam, assessing all new/exacerbated conditions, and establishing a new plan of care.  Today, my SHASHI, was here to observe &/or participate in the visit & follow plan of care established by me.

## 2024-04-17 NOTE — REASON FOR VISIT
[Spouse] : spouse [de-identified] : OPCAB x 3 [de-identified] : 4/5/24 [de-identified] : intraop uncomplicated. Extubated on POD 1. Post op course uneventful. Patient discharged home no 4/9. Patient presents for post op visit accompanied by his wife. He appears generally well, denies c/o chest pain, sob/alegria, fever, lower extremity edema or palpitations. He walked from his [address] on [address].  Chest xray today wnl, no effusion blood sent in office today revealed bun 85, creat 2.29, k 4.8, hgb 9.9, hct 30.6

## 2024-05-09 ENCOUNTER — TRANSCRIPTION ENCOUNTER (OUTPATIENT)
Age: 79
End: 2024-05-09

## 2024-12-09 NOTE — H&P ADULT - MLM HIDDEN
"Patient: Fer Griggs \"Jose\"    Procedure Summary       Date: 12/09/24 Room / Location: [facility]    Anesthesia Start: 1114 Anesthesia Stop: 1218    Procedure: COLONOSCOPY Diagnosis:       Encounter for general adult medical examination without abnormal findings      Encounter for screening for malignant neoplasm of colon    Scheduled Providers: Eloy Glover MD Responsible Provider: Page Florentino MD    Anesthesia Type: MAC ASA Status: 2            Anesthesia Type: MAC    Vitals Value Taken Time   BP 99/65 12/09/24 1218   Temp 36 12/09/24 1218   Pulse 66 12/09/24 1218   Resp 15 12/09/24 1218   SpO2 99 12/09/24 1218       Anesthesia Post Evaluation    Patient location during evaluation: PACU  Patient participation: complete - patient participated  Level of consciousness: awake and alert  Pain management: satisfactory to patient  Airway patency: patent  Two or more strategies used to mitigate risk of obstructive sleep apnea  Cardiovascular status: acceptable and blood pressure returned to baseline  Respiratory status: acceptable and face mask  Hydration status: acceptable  Postoperative Nausea and Vomiting: none      There were no known notable events for this encounter.    " yes

## 2025-09-18 ENCOUNTER — EMERGENCY (EMERGENCY)
Facility: HOSPITAL | Age: 80
LOS: 1 days | End: 2025-09-18
Attending: STUDENT IN AN ORGANIZED HEALTH CARE EDUCATION/TRAINING PROGRAM | Admitting: STUDENT IN AN ORGANIZED HEALTH CARE EDUCATION/TRAINING PROGRAM
Payer: MEDICARE

## 2025-09-18 VITALS
TEMPERATURE: 98 F | OXYGEN SATURATION: 96 % | DIASTOLIC BLOOD PRESSURE: 82 MMHG | WEIGHT: 160.06 LBS | SYSTOLIC BLOOD PRESSURE: 124 MMHG | HEART RATE: 66 BPM | HEIGHT: 63 IN | RESPIRATION RATE: 18 BRPM

## 2025-09-18 VITALS
TEMPERATURE: 98 F | DIASTOLIC BLOOD PRESSURE: 86 MMHG | OXYGEN SATURATION: 98 % | HEART RATE: 69 BPM | SYSTOLIC BLOOD PRESSURE: 146 MMHG | RESPIRATION RATE: 18 BRPM
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
## 2025-09-18 DIAGNOSIS — S05.12XA CONTUSION OF EYEBALL AND ORBITAL TISSUES, LEFT EYE, INITIAL ENCOUNTER: ICD-10-CM

## 2025-09-18 DIAGNOSIS — Z95.1 PRESENCE OF AORTOCORONARY BYPASS GRAFT: ICD-10-CM

## 2025-09-18 DIAGNOSIS — S80.02XA CONTUSION OF LEFT KNEE, INITIAL ENCOUNTER: ICD-10-CM

## 2025-09-18 DIAGNOSIS — R71.0 PRECIPITOUS DROP IN HEMATOCRIT: ICD-10-CM

## 2025-09-18 DIAGNOSIS — Z85.46 PERSONAL HISTORY OF MALIGNANT NEOPLASM OF PROSTATE: ICD-10-CM

## 2025-09-18 DIAGNOSIS — S83.8X2A SPRAIN OF OTHER SPECIFIED PARTS OF LEFT KNEE, INITIAL ENCOUNTER: Chronic | ICD-10-CM

## 2025-09-18 DIAGNOSIS — Z79.02 LONG TERM (CURRENT) USE OF ANTITHROMBOTICS/ANTIPLATELETS: ICD-10-CM

## 2025-09-18 DIAGNOSIS — Z88.0 ALLERGY STATUS TO PENICILLIN: ICD-10-CM

## 2025-09-18 DIAGNOSIS — Z88.1 ALLERGY STATUS TO OTHER ANTIBIOTIC AGENTS: ICD-10-CM

## 2025-09-18 DIAGNOSIS — K08.409 PARTIAL LOSS OF TEETH, UNSPECIFIED CAUSE, UNSPECIFIED CLASS: Chronic | ICD-10-CM

## 2025-09-18 DIAGNOSIS — Z79.82 LONG TERM (CURRENT) USE OF ASPIRIN: ICD-10-CM
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
## 2025-09-18 DIAGNOSIS — Z90.89 ACQUIRED ABSENCE OF OTHER ORGANS: Chronic | ICD-10-CM
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
## 2025-09-18 DIAGNOSIS — Z91.81 HISTORY OF FALLING: ICD-10-CM

## 2025-09-18 LAB
ALBUMIN SERPL ELPH-MCNC: 4 G/DL — SIGNIFICANT CHANGE UP (ref 3.3–5)
ALP SERPL-CCNC: 119 U/L — SIGNIFICANT CHANGE UP (ref 40–120)
ALT FLD-CCNC: 6 U/L — LOW (ref 10–45)
ANION GAP SERPL CALC-SCNC: 15 MMOL/L — SIGNIFICANT CHANGE UP (ref 5–17)
AST SERPL-CCNC: 11 U/L — SIGNIFICANT CHANGE UP (ref 10–40)
BASOPHILS # BLD AUTO: 0.03 K/UL — SIGNIFICANT CHANGE UP (ref 0–0.2)
BASOPHILS NFR BLD AUTO: 0.7 % — SIGNIFICANT CHANGE UP (ref 0–2)
BILIRUB SERPL-MCNC: 0.5 MG/DL — SIGNIFICANT CHANGE UP (ref 0.2–1.2)
BUN SERPL-MCNC: 35 MG/DL — HIGH (ref 7–23)
CALCIUM SERPL-MCNC: 9.3 MG/DL — SIGNIFICANT CHANGE UP (ref 8.4–10.5)
CHLORIDE SERPL-SCNC: 102 MMOL/L — SIGNIFICANT CHANGE UP (ref 96–108)
CO2 SERPL-SCNC: 23 MMOL/L — SIGNIFICANT CHANGE UP (ref 22–31)
CREAT SERPL-MCNC: 2.36 MG/DL — HIGH (ref 0.5–1.3)
EGFR: 27 ML/MIN/1.73M2 — LOW
EGFR: 27 ML/MIN/1.73M2 — LOW
EOSINOPHIL # BLD AUTO: 0.12 K/UL — SIGNIFICANT CHANGE UP (ref 0–0.5)
EOSINOPHIL NFR BLD AUTO: 2.9 % — SIGNIFICANT CHANGE UP (ref 0–6)
GLUCOSE SERPL-MCNC: 88 MG/DL — SIGNIFICANT CHANGE UP (ref 70–99)
HCT VFR BLD CALC: 34.2 % — LOW (ref 39–50)
HGB BLD-MCNC: 10.8 G/DL — LOW (ref 13–17)
IMM GRANULOCYTES # BLD AUTO: 0.02 K/UL — SIGNIFICANT CHANGE UP (ref 0–0.07)
IMM GRANULOCYTES NFR BLD AUTO: 0.5 % — SIGNIFICANT CHANGE UP (ref 0–0.9)
LYMPHOCYTES # BLD AUTO: 0.74 K/UL — LOW (ref 1–3.3)
LYMPHOCYTES NFR BLD AUTO: 17.8 % — SIGNIFICANT CHANGE UP (ref 13–44)
MCHC RBC-ENTMCNC: 30.2 PG — SIGNIFICANT CHANGE UP (ref 27–34)
MCHC RBC-ENTMCNC: 31.6 G/DL — LOW (ref 32–36)
MCV RBC AUTO: 95.5 FL — SIGNIFICANT CHANGE UP (ref 80–100)
MONOCYTES # BLD AUTO: 0.37 K/UL — SIGNIFICANT CHANGE UP (ref 0–0.9)
MONOCYTES NFR BLD AUTO: 8.9 % — SIGNIFICANT CHANGE UP (ref 2–14)
NEUTROPHILS # BLD AUTO: 2.87 K/UL — SIGNIFICANT CHANGE UP (ref 1.8–7.4)
NEUTROPHILS NFR BLD AUTO: 69.2 % — SIGNIFICANT CHANGE UP (ref 43–77)
NRBC # BLD AUTO: 0 K/UL — SIGNIFICANT CHANGE UP (ref 0–0)
NRBC # FLD: 0 K/UL — SIGNIFICANT CHANGE UP (ref 0–0)
NRBC BLD AUTO-RTO: 0 /100 WBCS — SIGNIFICANT CHANGE UP (ref 0–0)
PLATELET # BLD AUTO: 181 K/UL — SIGNIFICANT CHANGE UP (ref 150–400)
PMV BLD: 10.6 FL — SIGNIFICANT CHANGE UP (ref 7–13)
POTASSIUM SERPL-MCNC: 4.7 MMOL/L — SIGNIFICANT CHANGE UP (ref 3.5–5.3)
POTASSIUM SERPL-SCNC: 4.7 MMOL/L — SIGNIFICANT CHANGE UP (ref 3.5–5.3)
PROT SERPL-MCNC: 6.4 G/DL — SIGNIFICANT CHANGE UP (ref 6–8.3)
RBC # BLD: 3.58 M/UL — LOW (ref 4.2–5.8)
RBC # FLD: 14.2 % — SIGNIFICANT CHANGE UP (ref 10.3–14.5)
SODIUM SERPL-SCNC: 140 MMOL/L — SIGNIFICANT CHANGE UP (ref 135–145)
WBC # BLD: 4.15 K/UL — SIGNIFICANT CHANGE UP (ref 3.8–10.5)
WBC # FLD AUTO: 4.15 K/UL — SIGNIFICANT CHANGE UP (ref 3.8–10.5)

## 2025-09-18 PROCEDURE — 99283 EMERGENCY DEPT VISIT LOW MDM: CPT

## 2025-09-18 PROCEDURE — 70486 CT MAXILLOFACIAL W/O DYE: CPT | Mod: 26

## 2025-09-18 PROCEDURE — 70486 CT MAXILLOFACIAL W/O DYE: CPT

## 2025-09-18 PROCEDURE — 70450 CT HEAD/BRAIN W/O DYE: CPT

## 2025-09-18 PROCEDURE — 85025 COMPLETE CBC W/AUTO DIFF WBC: CPT

## 2025-09-18 PROCEDURE — 99285 EMERGENCY DEPT VISIT HI MDM: CPT

## 2025-09-18 PROCEDURE — 73564 X-RAY EXAM KNEE 4 OR MORE: CPT | Mod: 26,LT

## 2025-09-18 PROCEDURE — 72125 CT NECK SPINE W/O DYE: CPT | Mod: 26

## 2025-09-18 PROCEDURE — 80053 COMPREHEN METABOLIC PANEL: CPT

## 2025-09-18 PROCEDURE — 73564 X-RAY EXAM KNEE 4 OR MORE: CPT

## 2025-09-18 PROCEDURE — 93010 ELECTROCARDIOGRAM REPORT: CPT

## 2025-09-18 PROCEDURE — 99285 EMERGENCY DEPT VISIT HI MDM: CPT | Mod: 25

## 2025-09-18 PROCEDURE — 70450 CT HEAD/BRAIN W/O DYE: CPT | Mod: 26

## 2025-09-18 PROCEDURE — 72125 CT NECK SPINE W/O DYE: CPT

## 2025-09-18 PROCEDURE — 36415 COLL VENOUS BLD VENIPUNCTURE: CPT

## 2025-09-18 PROCEDURE — 93005 ELECTROCARDIOGRAM TRACING: CPT

## 2025-09-23 PROBLEM — D32.0 MENINGIOMA, CEREBRAL: Status: ACTIVE | Noted: 2025-09-23

## (undated) DEVICE — PACING CABLE (BROWN) A/V TEMP SCREW DOWN 12FT

## (undated) DEVICE — PACK OPEN HEART LNX

## (undated) DEVICE — CATH NG SALEM SUMP 16FR

## (undated) DEVICE — PREP SCRUB BRUSH W CHG 4%

## (undated) DEVICE — CATH TRIOX OXIMETRY 8F 3 LUMENS

## (undated) DEVICE — SYNOVIS VASCULAR PROBE 1.5MM 15CM

## (undated) DEVICE — BLOWER MISTER AXIUS WITH IV SET

## (undated) DEVICE — AROS VESSEL CLAMP SINGLE LARGE (YELLOW) 120G

## (undated) DEVICE — SUT PROLENE 7-0 24" BV175-6

## (undated) DEVICE — GAUZE SPONGE 12PLY DMT MT 8X4

## (undated) DEVICE — SUT ETHIBOND 0 18" TIES

## (undated) DEVICE — DRSG DERMABOND 0.7ML

## (undated) DEVICE — DRSG TEGADERM CHG 4X6-1/8"

## (undated) DEVICE — POSITIONER FOAM EGG CRATE ULNAR 2PCS (PINK)

## (undated) DEVICE — DRSG MEPILEX 10 X 25CM (4 X 10") AG

## (undated) DEVICE — GLV 7.5 PROTEXIS (WHITE)

## (undated) DEVICE — SUT VICRYL 0 27" CT

## (undated) DEVICE — BLADE SCALPEL SAFETY #11 WITH PLASTIC GREEN HANDLE

## (undated) DEVICE — SUT SILK 2-0 30" SH

## (undated) DEVICE — SUT MONOCRYL 4-0 27" PS-2 UNDYED

## (undated) DEVICE — ACROBAT I-POSITIONER

## (undated) DEVICE — WARMING BLANKET FULL ADULT

## (undated) DEVICE — SUT VICRYL PLUS 0 27" CT

## (undated) DEVICE — Device

## (undated) DEVICE — LAP PAD 18 X 18"

## (undated) DEVICE — SUT ETHIBOND 4-0 12-18"

## (undated) DEVICE — TUBING BRAT 2 SUCTION ASSEMBLY TWIST LOCK

## (undated) DEVICE — ELCTR BOVIE TIP BLADE INSULATED 4" EDGE

## (undated) DEVICE — DRAPE TOWEL BLUE 17" X 24"

## (undated) DEVICE — GLV 6 PROTEXIS (WHITE)

## (undated) DEVICE — ELCTR STRYKER NEPTUNE SMOKE EVACUATION PENCIL (GREEN)

## (undated) DEVICE — DRAPE PROBE COVER 5" X 96"

## (undated) DEVICE — VESSEL LOOP MAXI-BLUE 0.120" X 16"

## (undated) DEVICE — SUT VICRYL 1 36" CTX UNDYED

## (undated) DEVICE — GLV 7 PROTEXIS (WHITE)

## (undated) DEVICE — CHEST DRAIN PLEUR-EVAC DRY/WET ADULT-PEDS SINGLE (QUICK)

## (undated) DEVICE — TUBING SMOKE EVAC 3/8" X 10FT FOR NEPTUNE

## (undated) DEVICE — DRSG TRACH DRAINAGE 4X4

## (undated) DEVICE — TONGUE DEPRESSOR

## (undated) DEVICE — GLV 6.5 PROTEXIS (WHITE)

## (undated) DEVICE — SOL ANTI FOG

## (undated) DEVICE — DRAIN RESERVOIR FOR JACKSON PRATT 100CC CARDINAL

## (undated) DEVICE — BLADE SURGICAL #15 CARBON

## (undated) DEVICE — VASOVIEW HEMOPRO ENDO SYSTEM

## (undated) DEVICE — ELCTR REM POLYHESIVE ADULT PT RETURN 15FT

## (undated) DEVICE — BLADE ETHICON HARMONIC SYNERGY HOOK TIP 3MM 4CM-9CM

## (undated) DEVICE — FOLEY TRAY 16FR SURESTEP LF URINE METER TEMP SENSING STATLOCK

## (undated) DEVICE — SUT SILK 0 18" CT-1 (POP-OFF)

## (undated) DEVICE — DRAPE IOBAN 33" X 23"

## (undated) DEVICE — CATH IV SAFE BC 24G X 0.75" (YELLOW)

## (undated) DEVICE — SUT SILK 2-0 18" SH (POP-OFF)

## (undated) DEVICE — SUCTION CATH AIRLIFE CONTROL VALVE TRIFLO 14FR

## (undated) DEVICE — TUBING STRYKER PNEUMOCLEAR HIGH FLOW

## (undated) DEVICE — SUMP INTRACARDIAC/PERICARDIAL 20FR 1/4" ADULT

## (undated) DEVICE — SUT PROLENE 8-0 24" BV175-6

## (undated) DEVICE — KNIFE ALCON STANDARD FULL HANDLE 15 DEG (PINK)

## (undated) DEVICE — CATH CV TRAY INSR ST UNIV

## (undated) DEVICE — SUT NUROLON 1 18" OS-8 (POP-OFF)

## (undated) DEVICE — BLADE SCALPEL SAFETY #10 WITH PLASTIC GREEN HANDLE

## (undated) DEVICE — NDL BLUNT 18G LOOP VESSEL MAXI WHITE

## (undated) DEVICE — ACROBAT I-STABILIZER

## (undated) DEVICE — SUT STAINLESS STEEL 6 4-18" CCS

## (undated) DEVICE — DRSG ALLEVYN LIFE 6 X 6 (PINK)

## (undated) DEVICE — PACK PROC CV DRAPE

## (undated) DEVICE — GOWN TRIMAX XXL

## (undated) DEVICE — SUT PROLENE 6-0 30" RB-2

## (undated) DEVICE — SUT VICRYL 2-0 27" CT-1

## (undated) DEVICE — DRAPE FLUID WARMER 44 X 66"